# Patient Record
Sex: FEMALE | Race: WHITE | Employment: OTHER | URBAN - NONMETROPOLITAN AREA
[De-identification: names, ages, dates, MRNs, and addresses within clinical notes are randomized per-mention and may not be internally consistent; named-entity substitution may affect disease eponyms.]

---

## 2017-03-10 PROBLEM — E11.620 NLD (NECROBIOSIS LIPOIDICA DIABETICORUM) (HCC): Status: ACTIVE | Noted: 2017-03-10

## 2017-03-10 PROBLEM — M54.30 SCIATICA: Status: ACTIVE | Noted: 2017-03-10

## 2017-03-10 PROBLEM — I10 ESSENTIAL HYPERTENSION: Status: ACTIVE | Noted: 2017-03-10

## 2017-03-10 PROBLEM — M10.9 GOUT: Status: ACTIVE | Noted: 2017-03-10

## 2017-03-10 PROBLEM — E66.01 MORBID OBESITY (HCC): Status: ACTIVE | Noted: 2017-03-10

## 2017-05-08 ENCOUNTER — HOSPITAL ENCOUNTER (OUTPATIENT)
Age: 42
Discharge: HOME | End: 2017-05-08
Payer: COMMERCIAL

## 2017-05-08 LAB
ABSOLUTE EOS #: 0.2 K/UL (ref 0–0.4)
ABSOLUTE LYMPH #: 1.1 K/UL (ref 1–4.8)
ABSOLUTE MONO #: 0.4 K/UL (ref 0.2–0.8)
ALBUMIN SERPL-MCNC: 4 G/DL (ref 3.5–5.2)
ALBUMIN/GLOBULIN RATIO: 1.5 (ref 1–2.5)
ALP BLD-CCNC: 77 U/L (ref 35–104)
ALT SERPL-CCNC: 16 U/L (ref 5–33)
ANION GAP SERPL CALCULATED.3IONS-SCNC: 14 MMOL/L (ref 9–17)
AST SERPL-CCNC: 18 U/L
BASOPHILS # BLD: 0 %
BASOPHILS ABSOLUTE: 0 K/UL (ref 0–0.2)
BILIRUB SERPL-MCNC: 0.8 MG/DL (ref 0.3–1.2)
BUN BLDV-MCNC: 14 MG/DL (ref 6–20)
BUN/CREAT BLD: 19 (ref 9–20)
CALCIUM SERPL-MCNC: 8.6 MG/DL (ref 8.6–10.4)
CHLORIDE BLD-SCNC: 103 MMOL/L (ref 98–107)
CHOLESTEROL/HDL RATIO: 6.2
CHOLESTEROL: 217 MG/DL
CO2: 23 MMOL/L (ref 20–31)
CREAT SERPL-MCNC: 0.74 MG/DL (ref 0.5–0.9)
DIFFERENTIAL TYPE: ABNORMAL
EOSINOPHILS RELATIVE PERCENT: 3 %
GFR AFRICAN AMERICAN: >60 ML/MIN
GFR NON-AFRICAN AMERICAN: >60 ML/MIN
GFR SERPL CREATININE-BSD FRML MDRD: ABNORMAL ML/MIN/{1.73_M2}
GFR SERPL CREATININE-BSD FRML MDRD: ABNORMAL ML/MIN/{1.73_M2}
GLUCOSE BLD-MCNC: 113 MG/DL (ref 70–99)
HCT VFR BLD CALC: 26.5 % (ref 36–46)
HDLC SERPL-MCNC: 35 MG/DL
HEMOGLOBIN: 8.9 G/DL (ref 12–16)
LDL CHOLESTEROL: 121 MG/DL (ref 0–130)
LYMPHOCYTES # BLD: 20 %
MAGNESIUM: 1.7 MG/DL (ref 1.6–2.6)
MCH RBC QN AUTO: 28.4 PG (ref 26–34)
MCHC RBC AUTO-ENTMCNC: 33.6 G/DL (ref 31–37)
MCV RBC AUTO: 84.4 FL (ref 80–100)
MONOCYTES # BLD: 8 %
PDW BLD-RTO: 13.9 % (ref 12.1–15.2)
PLATELET # BLD: 242 K/UL (ref 140–450)
PLATELET ESTIMATE: ABNORMAL
PMV BLD AUTO: ABNORMAL FL (ref 6–12)
POTASSIUM SERPL-SCNC: 4.4 MMOL/L (ref 3.7–5.3)
RBC # BLD: 3.14 M/UL (ref 4–5.2)
RBC # BLD: ABNORMAL 10*6/UL
SEG NEUTROPHILS: 69 %
SEGMENTED NEUTROPHILS ABSOLUTE COUNT: 3.7 K/UL (ref 1.8–7.7)
SODIUM BLD-SCNC: 140 MMOL/L (ref 135–144)
TOTAL PROTEIN: 6.6 G/DL (ref 6.4–8.3)
TRIGL SERPL-MCNC: 303 MG/DL
URIC ACID: 6 MG/DL (ref 2.4–5.7)
VLDLC SERPL CALC-MCNC: ABNORMAL MG/DL (ref 1–30)
WBC # BLD: 5.4 K/UL (ref 3.5–11)
WBC # BLD: ABNORMAL 10*3/UL

## 2017-05-08 PROCEDURE — 80053 COMPREHEN METABOLIC PANEL: CPT

## 2017-05-08 PROCEDURE — 84550 ASSAY OF BLOOD/URIC ACID: CPT

## 2017-05-08 PROCEDURE — 36415 COLL VENOUS BLD VENIPUNCTURE: CPT

## 2017-05-08 PROCEDURE — 85025 COMPLETE CBC W/AUTO DIFF WBC: CPT

## 2017-05-08 PROCEDURE — 83735 ASSAY OF MAGNESIUM: CPT

## 2017-05-08 PROCEDURE — 80061 LIPID PANEL: CPT

## 2017-05-09 LAB
CULTURE: NORMAL
Lab: NORMAL
SPECIMEN DESCRIPTION: NORMAL
SPECIMEN DESCRIPTION: NORMAL
STATUS: NORMAL

## 2017-05-24 ENCOUNTER — HOSPITAL ENCOUNTER (OUTPATIENT)
Dept: ULTRASOUND IMAGING | Age: 42
Discharge: HOME | End: 2017-05-24
Payer: COMMERCIAL

## 2017-05-24 DIAGNOSIS — N93.9 ABNORMAL UTERINE BLEEDING (AUB): ICD-10-CM

## 2017-05-24 PROCEDURE — 76830 TRANSVAGINAL US NON-OB: CPT

## 2017-05-24 PROCEDURE — 76856 US EXAM PELVIC COMPLETE: CPT

## 2017-05-26 ENCOUNTER — HOSPITAL ENCOUNTER (OUTPATIENT)
Dept: WOMENS IMAGING | Age: 42
Discharge: HOME | End: 2017-05-26
Payer: COMMERCIAL

## 2017-05-26 DIAGNOSIS — Z13.9 SCREENING: ICD-10-CM

## 2017-05-26 PROCEDURE — G0202 SCR MAMMO BI INCL CAD: HCPCS

## 2017-06-02 ENCOUNTER — HOSPITAL ENCOUNTER (OUTPATIENT)
Age: 42
Discharge: HOME | End: 2017-06-02
Payer: COMMERCIAL

## 2017-06-02 PROBLEM — I83.009 VENOUS STASIS ULCER (HCC): Status: ACTIVE | Noted: 2017-06-02

## 2017-06-02 PROBLEM — J40 BRONCHITIS: Status: ACTIVE | Noted: 2017-06-02

## 2017-06-02 PROBLEM — N92.1 MENORRHAGIA WITH IRREGULAR CYCLE: Status: ACTIVE | Noted: 2017-06-02

## 2017-06-02 PROBLEM — L97.909 VENOUS STASIS ULCER (HCC): Status: ACTIVE | Noted: 2017-06-02

## 2017-06-02 LAB
ABSOLUTE EOS #: 0.2 K/UL (ref 0–0.4)
ABSOLUTE LYMPH #: 1.1 K/UL (ref 1–4.8)
ABSOLUTE MONO #: 0.4 K/UL (ref 0–1)
BASOPHILS # BLD: 1 %
BASOPHILS ABSOLUTE: 0 K/UL (ref 0–0.2)
DIFFERENTIAL TYPE: ABNORMAL
EOSINOPHILS RELATIVE PERCENT: 4 %
HCT VFR BLD CALC: 31.3 % (ref 36–46)
HEMOGLOBIN: 10.5 G/DL (ref 12–16)
LYMPHOCYTES # BLD: 21 %
MCH RBC QN AUTO: 30.9 PG (ref 26–34)
MCHC RBC AUTO-ENTMCNC: 33.6 G/DL (ref 31–37)
MCV RBC AUTO: 92 FL (ref 80–100)
MONOCYTES # BLD: 8 %
PDW BLD-RTO: 21.1 % (ref 12.1–15.2)
PLATELET # BLD: 308 K/UL (ref 140–450)
PLATELET ESTIMATE: ABNORMAL
PMV BLD AUTO: ABNORMAL FL (ref 6–12)
RBC # BLD: 3.4 M/UL (ref 4–5.2)
RBC # BLD: ABNORMAL 10*6/UL
SEG NEUTROPHILS: 66 %
SEGMENTED NEUTROPHILS ABSOLUTE COUNT: 3.6 K/UL (ref 1.8–7.7)
WBC # BLD: 5.4 K/UL (ref 3.5–11)
WBC # BLD: ABNORMAL 10*3/UL

## 2017-06-02 PROCEDURE — 86480 TB TEST CELL IMMUN MEASURE: CPT

## 2017-06-02 PROCEDURE — 85025 COMPLETE CBC W/AUTO DIFF WBC: CPT

## 2017-06-02 PROCEDURE — 36415 COLL VENOUS BLD VENIPUNCTURE: CPT

## 2017-06-05 LAB
QUANTIFERON (R) TB GOLD (INCUBATED): NEGATIVE
QUANTIFERON MITOGEN: >10 IU/ML
QUANTIFERON NIL: 0.05 IU/ML
QUANTIFERON TB AG MINUS NIL: 0.01 IU/ML (ref 0–0.34)

## 2017-07-24 ENCOUNTER — HOSPITAL ENCOUNTER (OUTPATIENT)
Age: 42
Discharge: HOME | End: 2017-07-24
Payer: COMMERCIAL

## 2017-07-24 DIAGNOSIS — D68.59 HYPERCOAGULABLE STATE (HCC): ICD-10-CM

## 2017-07-24 LAB
ANION GAP SERPL CALCULATED.3IONS-SCNC: 13 MMOL/L (ref 9–17)
BUN BLDV-MCNC: 17 MG/DL (ref 6–20)
BUN/CREAT BLD: 17 (ref 9–20)
CALCIUM SERPL-MCNC: 8.5 MG/DL (ref 8.6–10.4)
CHLORIDE BLD-SCNC: 106 MMOL/L (ref 98–107)
CO2: 23 MMOL/L (ref 20–31)
CREAT SERPL-MCNC: 0.99 MG/DL (ref 0.5–0.9)
FERRITIN: 32 UG/L (ref 13–150)
GFR AFRICAN AMERICAN: >60 ML/MIN
GFR NON-AFRICAN AMERICAN: >60 ML/MIN
GFR SERPL CREATININE-BSD FRML MDRD: ABNORMAL ML/MIN/{1.73_M2}
GFR SERPL CREATININE-BSD FRML MDRD: ABNORMAL ML/MIN/{1.73_M2}
GLUCOSE BLD-MCNC: 110 MG/DL (ref 70–99)
HCT VFR BLD CALC: 22.9 % (ref 36–46)
HEMOGLOBIN: 7.6 G/DL (ref 12–16)
IRON SATURATION: 14 % (ref 20–55)
IRON: 45 UG/DL (ref 37–145)
MCH RBC QN AUTO: 31.5 PG (ref 26–34)
MCHC RBC AUTO-ENTMCNC: 33.3 G/DL (ref 31–37)
MCV RBC AUTO: 94.5 FL (ref 80–100)
PDW BLD-RTO: 16.6 % (ref 12.1–15.2)
PLATELET # BLD: 293 K/UL (ref 140–450)
PMV BLD AUTO: 8.6 FL (ref 6–12)
POTASSIUM SERPL-SCNC: 4.4 MMOL/L (ref 3.7–5.3)
RBC # BLD: 2.42 M/UL (ref 4–5.2)
SODIUM BLD-SCNC: 142 MMOL/L (ref 135–144)
TOTAL IRON BINDING CAPACITY: 314 UG/DL (ref 250–450)
UNSATURATED IRON BINDING CAPACITY: 268.8 UG/DL (ref 112–347)
WBC # BLD: 7.3 K/UL (ref 3.5–11)

## 2017-07-24 PROCEDURE — 80048 BASIC METABOLIC PNL TOTAL CA: CPT

## 2017-07-24 PROCEDURE — 85027 COMPLETE CBC AUTOMATED: CPT

## 2017-07-24 PROCEDURE — 83550 IRON BINDING TEST: CPT

## 2017-07-24 PROCEDURE — 83540 ASSAY OF IRON: CPT

## 2017-07-24 PROCEDURE — 82728 ASSAY OF FERRITIN: CPT

## 2017-07-24 PROCEDURE — 36415 COLL VENOUS BLD VENIPUNCTURE: CPT

## 2017-08-08 ENCOUNTER — HOSPITAL ENCOUNTER (OUTPATIENT)
Age: 42
Discharge: HOME | End: 2017-08-08
Payer: COMMERCIAL

## 2017-08-08 ENCOUNTER — HOSPITAL ENCOUNTER (OUTPATIENT)
Dept: GENERAL RADIOLOGY | Age: 42
Discharge: HOME | End: 2017-08-08
Payer: COMMERCIAL

## 2017-08-08 DIAGNOSIS — Z00.00 ROUTINE GENERAL MEDICAL EXAMINATION AT A HEALTH CARE FACILITY: ICD-10-CM

## 2017-08-08 PROBLEM — I87.1: Status: ACTIVE | Noted: 2017-08-08

## 2017-08-08 PROBLEM — R05.3 CHRONIC COUGH: Status: ACTIVE | Noted: 2017-08-08

## 2017-08-08 PROBLEM — D64.9 ANEMIA: Status: ACTIVE | Noted: 2017-08-08

## 2017-08-08 LAB
ANION GAP SERPL CALCULATED.3IONS-SCNC: 15 MMOL/L (ref 9–17)
BUN BLDV-MCNC: 18 MG/DL (ref 6–20)
BUN/CREAT BLD: 23 (ref 9–20)
CALCIUM SERPL-MCNC: 9.3 MG/DL (ref 8.6–10.4)
CHLORIDE BLD-SCNC: 99 MMOL/L (ref 98–107)
CO2: 24 MMOL/L (ref 20–31)
CREAT SERPL-MCNC: 0.8 MG/DL (ref 0.5–0.9)
GFR AFRICAN AMERICAN: >60 ML/MIN
GFR NON-AFRICAN AMERICAN: >60 ML/MIN
GFR SERPL CREATININE-BSD FRML MDRD: ABNORMAL ML/MIN/{1.73_M2}
GFR SERPL CREATININE-BSD FRML MDRD: ABNORMAL ML/MIN/{1.73_M2}
GLUCOSE BLD-MCNC: 115 MG/DL (ref 70–99)
HCT VFR BLD CALC: 33.8 % (ref 36–46)
HEMOGLOBIN: 11.3 G/DL (ref 12–16)
MCH RBC QN AUTO: 31.9 PG (ref 26–34)
MCHC RBC AUTO-ENTMCNC: 33.3 G/DL (ref 31–37)
MCV RBC AUTO: 95.6 FL (ref 80–100)
PDW BLD-RTO: 15.6 % (ref 12.1–15.2)
PLATELET # BLD: 300 K/UL (ref 140–450)
PMV BLD AUTO: 8.9 FL (ref 6–12)
POTASSIUM SERPL-SCNC: 4.1 MMOL/L (ref 3.7–5.3)
RBC # BLD: 3.54 M/UL (ref 4–5.2)
SODIUM BLD-SCNC: 138 MMOL/L (ref 135–144)
WBC # BLD: 7.2 K/UL (ref 3.5–11)

## 2017-08-08 PROCEDURE — 36415 COLL VENOUS BLD VENIPUNCTURE: CPT

## 2017-08-08 PROCEDURE — 71020 XR CHEST STANDARD TWO VW: CPT

## 2017-08-08 PROCEDURE — 85027 COMPLETE CBC AUTOMATED: CPT

## 2017-08-08 PROCEDURE — 80048 BASIC METABOLIC PNL TOTAL CA: CPT

## 2017-08-09 PROBLEM — Z00.00 ROUTINE GENERAL MEDICAL EXAMINATION AT A HEALTH CARE FACILITY: Status: RESOLVED | Noted: 2017-08-08 | Resolved: 2017-08-09

## 2017-08-31 ENCOUNTER — HOSPITAL ENCOUNTER (OUTPATIENT)
Dept: WOUND CARE | Age: 42
Discharge: HOME | End: 2017-08-31
Payer: COMMERCIAL

## 2017-08-31 ENCOUNTER — HOSPITAL ENCOUNTER (OUTPATIENT)
Age: 42
Discharge: HOME | End: 2017-08-31
Payer: COMMERCIAL

## 2017-08-31 ENCOUNTER — HOSPITAL ENCOUNTER (OUTPATIENT)
Dept: LAB | Age: 42
Discharge: HOME | End: 2017-08-31
Payer: COMMERCIAL

## 2017-08-31 VITALS
RESPIRATION RATE: 20 BRPM | TEMPERATURE: 98.6 F | BODY MASS INDEX: 46.41 KG/M2 | HEART RATE: 73 BPM | DIASTOLIC BLOOD PRESSURE: 79 MMHG | WEIGHT: 261.9 LBS | SYSTOLIC BLOOD PRESSURE: 143 MMHG | HEIGHT: 63 IN

## 2017-08-31 DIAGNOSIS — I87.031: Primary | Chronic | ICD-10-CM

## 2017-08-31 LAB
-: ABNORMAL
ABSOLUTE EOS #: 0.3 K/UL (ref 0–0.4)
ABSOLUTE LYMPH #: 1.4 K/UL (ref 1–4.8)
ABSOLUTE MONO #: 0.7 K/UL (ref 0–1)
ALBUMIN SERPL-MCNC: 4.5 G/DL (ref 3.5–5.2)
ALBUMIN/GLOBULIN RATIO: 1.5 (ref 1–2.5)
ALP BLD-CCNC: 86 U/L (ref 35–104)
ALT SERPL-CCNC: 26 U/L (ref 5–33)
AMORPHOUS: ABNORMAL
ANION GAP SERPL CALCULATED.3IONS-SCNC: 15 MMOL/L (ref 9–17)
AST SERPL-CCNC: 24 U/L
BACTERIA: ABNORMAL
BASOPHILS # BLD: 2 %
BASOPHILS ABSOLUTE: 0.2 K/UL (ref 0–0.2)
BILIRUB SERPL-MCNC: 0.57 MG/DL (ref 0.3–1.2)
BILIRUBIN URINE: NEGATIVE
BUN BLDV-MCNC: 19 MG/DL (ref 6–20)
BUN/CREAT BLD: 22 (ref 9–20)
CALCIUM SERPL-MCNC: 9.3 MG/DL (ref 8.6–10.4)
CASTS UA: ABNORMAL /LPF
CHLORIDE BLD-SCNC: 98 MMOL/L (ref 98–107)
CHOLESTEROL/HDL RATIO: 6.3
CHOLESTEROL: 241 MG/DL
CO2: 24 MMOL/L (ref 20–31)
COLOR: YELLOW
COMMENT UA: ABNORMAL
CREAT SERPL-MCNC: 0.87 MG/DL (ref 0.5–0.9)
CRYSTALS, UA: ABNORMAL /HPF
DIFFERENTIAL TYPE: ABNORMAL
EOSINOPHILS RELATIVE PERCENT: 2 %
EPITHELIAL CELLS UA: ABNORMAL /HPF (ref 0–25)
GFR AFRICAN AMERICAN: >60 ML/MIN
GFR NON-AFRICAN AMERICAN: >60 ML/MIN
GFR SERPL CREATININE-BSD FRML MDRD: ABNORMAL ML/MIN/{1.73_M2}
GFR SERPL CREATININE-BSD FRML MDRD: ABNORMAL ML/MIN/{1.73_M2}
GLUCOSE BLD-MCNC: 95 MG/DL (ref 70–99)
GLUCOSE URINE: NEGATIVE
HCT VFR BLD CALC: 39.4 % (ref 36–46)
HDLC SERPL-MCNC: 38 MG/DL
HEMOGLOBIN: 13.1 G/DL (ref 12–16)
KETONES, URINE: NEGATIVE
LDL CHOLESTEROL: 129 MG/DL (ref 0–130)
LEUKOCYTE ESTERASE, URINE: NEGATIVE
LYMPHOCYTES # BLD: 12 %
MAGNESIUM: 1.7 MG/DL (ref 1.6–2.6)
MCH RBC QN AUTO: 31.3 PG (ref 26–34)
MCHC RBC AUTO-ENTMCNC: 33.4 G/DL (ref 31–37)
MCV RBC AUTO: 93.8 FL (ref 80–100)
MONOCYTES # BLD: 6 %
MUCUS: ABNORMAL
NITRITE, URINE: NEGATIVE
OTHER OBSERVATIONS UA: ABNORMAL
PDW BLD-RTO: 14.1 % (ref 12.1–15.2)
PH UA: 5.5 (ref 5–9)
PLATELET # BLD: 336 K/UL (ref 140–450)
PLATELET ESTIMATE: ABNORMAL
PMV BLD AUTO: 9.1 FL (ref 6–12)
POTASSIUM SERPL-SCNC: 4.2 MMOL/L (ref 3.7–5.3)
PROTEIN UA: NEGATIVE
RBC # BLD: 4.2 M/UL (ref 4–5.2)
RBC # BLD: ABNORMAL 10*6/UL
RBC UA: ABNORMAL /HPF (ref 0–2)
RENAL EPITHELIAL, UA: ABNORMAL /HPF
SEG NEUTROPHILS: 78 %
SEGMENTED NEUTROPHILS ABSOLUTE COUNT: 9.1 K/UL (ref 1.8–7.7)
SODIUM BLD-SCNC: 137 MMOL/L (ref 135–144)
SPECIFIC GRAVITY UA: >1.03 (ref 1.01–1.02)
TOTAL PROTEIN: 7.6 G/DL (ref 6.4–8.3)
TRICHOMONAS: ABNORMAL
TRIGL SERPL-MCNC: 370 MG/DL
TURBIDITY: CLEAR
URIC ACID: 5.3 MG/DL (ref 2.4–5.7)
URINE HGB: ABNORMAL
UROBILINOGEN, URINE: NORMAL
VLDLC SERPL CALC-MCNC: ABNORMAL MG/DL (ref 1–30)
WBC # BLD: 11.7 K/UL (ref 3.5–11)
WBC # BLD: ABNORMAL 10*3/UL
WBC UA: ABNORMAL /HPF (ref 0–5)
YEAST: ABNORMAL

## 2017-08-31 PROCEDURE — 85025 COMPLETE CBC W/AUTO DIFF WBC: CPT

## 2017-08-31 PROCEDURE — 86403 PARTICLE AGGLUT ANTBDY SCRN: CPT

## 2017-08-31 PROCEDURE — 87070 CULTURE OTHR SPECIMN AEROBIC: CPT

## 2017-08-31 PROCEDURE — 80053 COMPREHEN METABOLIC PANEL: CPT

## 2017-08-31 PROCEDURE — 36415 COLL VENOUS BLD VENIPUNCTURE: CPT

## 2017-08-31 PROCEDURE — 87077 CULTURE AEROBIC IDENTIFY: CPT

## 2017-08-31 PROCEDURE — 83735 ASSAY OF MAGNESIUM: CPT

## 2017-08-31 PROCEDURE — 87205 SMEAR GRAM STAIN: CPT

## 2017-08-31 PROCEDURE — 80061 LIPID PANEL: CPT

## 2017-08-31 PROCEDURE — 87184 SC STD DISK METHOD PER PLATE: CPT

## 2017-08-31 PROCEDURE — 84550 ASSAY OF BLOOD/URIC ACID: CPT

## 2017-08-31 PROCEDURE — 11042 DBRDMT SUBQ TIS 1ST 20SQCM/<: CPT

## 2017-08-31 PROCEDURE — 11042 DBRDMT SUBQ TIS 1ST 20SQCM/<: CPT | Performed by: PODIATRIST

## 2017-08-31 PROCEDURE — 81001 URINALYSIS AUTO W/SCOPE: CPT

## 2017-08-31 PROCEDURE — 87186 SC STD MICRODIL/AGAR DIL: CPT

## 2017-09-05 ENCOUNTER — PRE-PROCEDURE TELEPHONE (OUTPATIENT)
Dept: WOUND CARE | Age: 42
End: 2017-09-05

## 2017-09-05 LAB
CULTURE: ABNORMAL
DIRECT EXAM: ABNORMAL
DIRECT EXAM: ABNORMAL
Lab: ABNORMAL
Lab: ABNORMAL
ORGANISM: ABNORMAL
SPECIMEN DESCRIPTION: ABNORMAL
SPECIMEN DESCRIPTION: ABNORMAL
STATUS: ABNORMAL

## 2017-09-15 PROBLEM — E78.5 HYPERLIPIDEMIA: Status: ACTIVE | Noted: 2017-09-15

## 2017-09-15 PROBLEM — E66.9 OBESITY: Status: ACTIVE | Noted: 2017-09-15

## 2017-09-20 ENCOUNTER — HOSPITAL ENCOUNTER (OUTPATIENT)
Dept: LAB | Age: 42
Setting detail: SPECIMEN
Discharge: HOME | End: 2017-09-20
Payer: COMMERCIAL

## 2017-09-20 ENCOUNTER — HOSPITAL ENCOUNTER (OUTPATIENT)
Dept: WOUND CARE | Age: 42
Discharge: HOME | End: 2017-09-20
Payer: COMMERCIAL

## 2017-09-20 VITALS
SYSTOLIC BLOOD PRESSURE: 136 MMHG | DIASTOLIC BLOOD PRESSURE: 77 MMHG | TEMPERATURE: 98.1 F | RESPIRATION RATE: 20 BRPM | HEIGHT: 63 IN | HEART RATE: 77 BPM

## 2017-09-20 DIAGNOSIS — A49.8 BACTERIAL INFECTION DUE TO PSEUDOMONAS: ICD-10-CM

## 2017-09-20 DIAGNOSIS — A49.01 BACTERIAL INFECTION DUE TO STAPHYLOCOCCUS AUREUS: ICD-10-CM

## 2017-09-20 DIAGNOSIS — E11.620 NLD (NECROBIOSIS LIPOIDICA DIABETICORUM) (HCC): Primary | ICD-10-CM

## 2017-09-20 PROCEDURE — 87077 CULTURE AEROBIC IDENTIFY: CPT

## 2017-09-20 PROCEDURE — 87070 CULTURE OTHR SPECIMN AEROBIC: CPT

## 2017-09-20 PROCEDURE — 87186 SC STD MICRODIL/AGAR DIL: CPT

## 2017-09-20 PROCEDURE — 86403 PARTICLE AGGLUT ANTBDY SCRN: CPT

## 2017-09-20 PROCEDURE — 99212 OFFICE O/P EST SF 10 MIN: CPT | Performed by: PODIATRIST

## 2017-09-20 PROCEDURE — 99212 OFFICE O/P EST SF 10 MIN: CPT

## 2017-09-20 PROCEDURE — 87205 SMEAR GRAM STAIN: CPT

## 2017-09-23 LAB
CULTURE: ABNORMAL
DIRECT EXAM: ABNORMAL
Lab: ABNORMAL
Lab: ABNORMAL
ORGANISM: ABNORMAL
SPECIMEN DESCRIPTION: ABNORMAL
SPECIMEN DESCRIPTION: ABNORMAL
STATUS: ABNORMAL

## 2017-09-25 PROBLEM — A49.8 BACTERIAL INFECTION DUE TO PSEUDOMONAS: Status: ACTIVE | Noted: 2017-09-25

## 2017-09-25 PROBLEM — A49.01 BACTERIAL INFECTION DUE TO STAPHYLOCOCCUS AUREUS: Status: ACTIVE | Noted: 2017-09-25

## 2017-10-23 ENCOUNTER — HOSPITAL ENCOUNTER (OUTPATIENT)
Age: 42
Setting detail: SPECIMEN
Discharge: HOME | End: 2017-10-23
Payer: COMMERCIAL

## 2017-10-23 ENCOUNTER — HOSPITAL ENCOUNTER (OUTPATIENT)
Dept: INTERVENTIONAL RADIOLOGY/VASCULAR | Age: 42
Discharge: HOME | End: 2017-10-23
Payer: COMMERCIAL

## 2017-10-23 ENCOUNTER — HOSPITAL ENCOUNTER (OUTPATIENT)
Dept: GENERAL RADIOLOGY | Age: 42
Discharge: HOME | End: 2017-10-23
Payer: COMMERCIAL

## 2017-10-23 VITALS — RESPIRATION RATE: 16 BRPM | OXYGEN SATURATION: 100 % | HEART RATE: 79 BPM

## 2017-10-23 DIAGNOSIS — S81.809A OPEN WOUND OF LOWER EXTREMITY, UNSPECIFIED LATERALITY, INITIAL ENCOUNTER: ICD-10-CM

## 2017-10-23 DIAGNOSIS — Z95.828 S/P PICC CENTRAL LINE PLACEMENT: ICD-10-CM

## 2017-10-23 LAB
ABSOLUTE EOS #: 0.2 K/UL (ref 0–0.4)
ABSOLUTE IMMATURE GRANULOCYTE: NORMAL K/UL (ref 0–0.3)
ABSOLUTE LYMPH #: 1.5 K/UL (ref 1–4.8)
ABSOLUTE MONO #: 0.7 K/UL (ref 0.2–0.8)
ANION GAP SERPL CALCULATED.3IONS-SCNC: 16 MMOL/L (ref 9–17)
BASOPHILS # BLD: 0 %
BASOPHILS ABSOLUTE: 0 K/UL (ref 0–0.2)
BUN BLDV-MCNC: 16 MG/DL (ref 6–20)
BUN/CREAT BLD: 23 (ref 9–20)
CALCIUM SERPL-MCNC: 9.4 MG/DL (ref 8.6–10.4)
CHLORIDE BLD-SCNC: 102 MMOL/L (ref 98–107)
CO2: 23 MMOL/L (ref 20–31)
CREAT SERPL-MCNC: 0.69 MG/DL (ref 0.5–0.9)
DIFFERENTIAL TYPE: NORMAL
EOSINOPHILS RELATIVE PERCENT: 3 %
GFR AFRICAN AMERICAN: >60 ML/MIN
GFR NON-AFRICAN AMERICAN: >60 ML/MIN
GFR SERPL CREATININE-BSD FRML MDRD: ABNORMAL ML/MIN/{1.73_M2}
GFR SERPL CREATININE-BSD FRML MDRD: ABNORMAL ML/MIN/{1.73_M2}
GLUCOSE BLD-MCNC: 78 MG/DL (ref 70–99)
HCT VFR BLD CALC: 39.2 % (ref 36–46)
HEMOGLOBIN: 13 G/DL (ref 12–16)
IMMATURE GRANULOCYTES: NORMAL %
LYMPHOCYTES # BLD: 18 %
MCH RBC QN AUTO: 31.1 PG (ref 26–34)
MCHC RBC AUTO-ENTMCNC: 33.2 G/DL (ref 31–37)
MCV RBC AUTO: 93.5 FL (ref 80–100)
MONOCYTES # BLD: 8 %
PDW BLD-RTO: 13.2 % (ref 12.1–15.2)
PLATELET # BLD: 307 K/UL (ref 140–450)
PLATELET ESTIMATE: NORMAL
PMV BLD AUTO: 9.4 FL (ref 6–12)
POTASSIUM SERPL-SCNC: 4.4 MMOL/L (ref 3.7–5.3)
RBC # BLD: 4.19 M/UL (ref 4–5.2)
RBC # BLD: NORMAL 10*6/UL
SEG NEUTROPHILS: 71 %
SEGMENTED NEUTROPHILS ABSOLUTE COUNT: 5.9 K/UL (ref 1.8–7.7)
SODIUM BLD-SCNC: 141 MMOL/L (ref 135–144)
WBC # BLD: 8.3 K/UL (ref 3.5–11)
WBC # BLD: NORMAL 10*3/UL

## 2017-10-23 PROCEDURE — 77002 NEEDLE LOCALIZATION BY XRAY: CPT

## 2017-10-23 PROCEDURE — 36569 INSJ PICC 5 YR+ W/O IMAGING: CPT | Performed by: RADIOLOGY

## 2017-10-23 PROCEDURE — 85025 COMPLETE CBC W/AUTO DIFF WBC: CPT

## 2017-10-23 PROCEDURE — 80048 BASIC METABOLIC PNL TOTAL CA: CPT

## 2017-10-23 PROCEDURE — C1751 CATH, INF, PER/CENT/MIDLINE: HCPCS

## 2017-10-23 PROCEDURE — 77001 FLUOROGUIDE FOR VEIN DEVICE: CPT | Performed by: RADIOLOGY

## 2017-10-23 PROCEDURE — 2500000003 HC RX 250 WO HCPCS: Performed by: RADIOLOGY

## 2017-10-23 RX ORDER — LIDOCAINE HYDROCHLORIDE 20 MG/ML
INJECTION, SOLUTION INFILTRATION; PERINEURAL
Status: COMPLETED | OUTPATIENT
Start: 2017-10-23 | End: 2017-10-23

## 2017-10-23 RX ADMIN — LIDOCAINE HYDROCHLORIDE 2 ML: 20 INJECTION, SOLUTION INFILTRATION; PERINEURAL at 15:40

## 2017-10-23 NOTE — SEDATION DOCUMENTATION
Pt tolerated procedure well. Drsg applied. Pt cleaned . Instructions are give. Pt left ambulatory without any difficulites.

## 2017-11-29 ENCOUNTER — HOSPITAL ENCOUNTER (OUTPATIENT)
Age: 42
Discharge: HOME | End: 2017-11-29
Payer: COMMERCIAL

## 2017-11-29 LAB
-: ABNORMAL
ABSOLUTE EOS #: 0.3 K/UL (ref 0–0.4)
ABSOLUTE IMMATURE GRANULOCYTE: ABNORMAL K/UL (ref 0–0.3)
ABSOLUTE LYMPH #: 2.6 K/UL (ref 1–4.8)
ABSOLUTE MONO #: 0.9 K/UL (ref 0–1)
ALBUMIN SERPL-MCNC: 3.8 G/DL (ref 3.5–5.2)
ALBUMIN/GLOBULIN RATIO: 1.4 (ref 1–2.5)
ALP BLD-CCNC: 58 U/L (ref 35–104)
ALT SERPL-CCNC: 38 U/L (ref 5–33)
AMORPHOUS: ABNORMAL
ANION GAP SERPL CALCULATED.3IONS-SCNC: 15 MMOL/L (ref 9–17)
AST SERPL-CCNC: 14 U/L
BACTERIA: ABNORMAL
BASOPHILS # BLD: 1 % (ref 0–2)
BASOPHILS ABSOLUTE: 0.2 K/UL (ref 0–0.2)
BILIRUB SERPL-MCNC: 0.5 MG/DL (ref 0.3–1.2)
BILIRUBIN URINE: NEGATIVE
BUN BLDV-MCNC: 26 MG/DL (ref 6–20)
BUN/CREAT BLD: 33 (ref 9–20)
CALCIUM SERPL-MCNC: 8.8 MG/DL (ref 8.6–10.4)
CASTS UA: ABNORMAL /LPF
CHLORIDE BLD-SCNC: 101 MMOL/L (ref 98–107)
CHOLESTEROL/HDL RATIO: 2.7
CHOLESTEROL: 203 MG/DL
CO2: 24 MMOL/L (ref 20–31)
COLOR: YELLOW
COMMENT UA: ABNORMAL
CREAT SERPL-MCNC: 0.78 MG/DL (ref 0.5–0.9)
CRYSTALS, UA: ABNORMAL /HPF
DIFFERENTIAL TYPE: ABNORMAL
EOSINOPHILS RELATIVE PERCENT: 2 % (ref 0–8)
EPITHELIAL CELLS UA: ABNORMAL /HPF (ref 0–25)
GFR AFRICAN AMERICAN: >60 ML/MIN
GFR NON-AFRICAN AMERICAN: >60 ML/MIN
GFR SERPL CREATININE-BSD FRML MDRD: ABNORMAL ML/MIN/{1.73_M2}
GFR SERPL CREATININE-BSD FRML MDRD: ABNORMAL ML/MIN/{1.73_M2}
GLUCOSE BLD-MCNC: 86 MG/DL (ref 70–99)
GLUCOSE URINE: NEGATIVE
HCT VFR BLD CALC: 42.4 % (ref 36–46)
HDLC SERPL-MCNC: 75 MG/DL
HEMOGLOBIN: 13.9 G/DL (ref 12–16)
IMMATURE GRANULOCYTES: ABNORMAL %
KETONES, URINE: NEGATIVE
LDL CHOLESTEROL: 100 MG/DL (ref 0–130)
LEUKOCYTE ESTERASE, URINE: NEGATIVE
LYMPHOCYTES # BLD: 20 % (ref 24–44)
MAGNESIUM: 1.9 MG/DL (ref 1.6–2.6)
MCH RBC QN AUTO: 31.5 PG (ref 26–34)
MCHC RBC AUTO-ENTMCNC: 32.9 G/DL (ref 31–37)
MCV RBC AUTO: 95.7 FL (ref 80–100)
MONOCYTES # BLD: 7 % (ref 0–12)
MUCUS: ABNORMAL
NITRITE, URINE: NEGATIVE
OTHER OBSERVATIONS UA: ABNORMAL
PDW BLD-RTO: 14.4 % (ref 12.1–15.2)
PH UA: 5.5 (ref 5–9)
PLATELET # BLD: 255 K/UL (ref 140–450)
PLATELET ESTIMATE: ABNORMAL
PMV BLD AUTO: 8.6 FL (ref 6–12)
POTASSIUM SERPL-SCNC: 4.1 MMOL/L (ref 3.7–5.3)
PROTEIN UA: NEGATIVE
RBC # BLD: 4.43 M/UL (ref 4–5.2)
RBC # BLD: ABNORMAL 10*6/UL
RBC UA: ABNORMAL /HPF (ref 0–2)
RENAL EPITHELIAL, UA: ABNORMAL /HPF
SEG NEUTROPHILS: 70 % (ref 36–66)
SEGMENTED NEUTROPHILS ABSOLUTE COUNT: 9.3 K/UL (ref 1.8–7.7)
SODIUM BLD-SCNC: 140 MMOL/L (ref 135–144)
SPECIFIC GRAVITY UA: >1.03 (ref 1.01–1.02)
TOTAL PROTEIN: 6.6 G/DL (ref 6.4–8.3)
TRICHOMONAS: ABNORMAL
TRIGL SERPL-MCNC: 141 MG/DL
TURBIDITY: CLEAR
URIC ACID: 3.7 MG/DL (ref 2.4–5.7)
URINE HGB: ABNORMAL
UROBILINOGEN, URINE: NORMAL
VLDLC SERPL CALC-MCNC: ABNORMAL MG/DL (ref 1–30)
WBC # BLD: 13.3 K/UL (ref 3.5–11)
WBC # BLD: ABNORMAL 10*3/UL
WBC UA: ABNORMAL /HPF (ref 0–5)
YEAST: ABNORMAL

## 2017-11-29 PROCEDURE — 83735 ASSAY OF MAGNESIUM: CPT

## 2017-11-29 PROCEDURE — 80053 COMPREHEN METABOLIC PANEL: CPT

## 2017-11-29 PROCEDURE — 81001 URINALYSIS AUTO W/SCOPE: CPT

## 2017-11-29 PROCEDURE — 80061 LIPID PANEL: CPT

## 2017-11-29 PROCEDURE — 85025 COMPLETE CBC W/AUTO DIFF WBC: CPT

## 2017-11-29 PROCEDURE — 84550 ASSAY OF BLOOD/URIC ACID: CPT

## 2017-11-29 PROCEDURE — 36415 COLL VENOUS BLD VENIPUNCTURE: CPT

## 2017-12-21 ENCOUNTER — HOSPITAL ENCOUNTER (OUTPATIENT)
Dept: WOUND CARE | Age: 42
Discharge: HOME | End: 2017-12-21
Payer: COMMERCIAL

## 2017-12-21 ENCOUNTER — HOSPITAL ENCOUNTER (OUTPATIENT)
Dept: LAB | Age: 42
Discharge: HOME | End: 2017-12-21
Payer: COMMERCIAL

## 2017-12-21 VITALS
TEMPERATURE: 98.5 F | RESPIRATION RATE: 20 BRPM | BODY MASS INDEX: 50.36 KG/M2 | DIASTOLIC BLOOD PRESSURE: 72 MMHG | WEIGHT: 284.2 LBS | HEIGHT: 63 IN | HEART RATE: 88 BPM | SYSTOLIC BLOOD PRESSURE: 138 MMHG

## 2017-12-21 DIAGNOSIS — I83.022 VENOUS STASIS ULCER OF LEFT CALF WITH FAT LAYER EXPOSED WITH VARICOSE VEINS (HCC): Primary | ICD-10-CM

## 2017-12-21 DIAGNOSIS — L97.222 VENOUS STASIS ULCER OF LEFT CALF WITH FAT LAYER EXPOSED WITH VARICOSE VEINS (HCC): Primary | ICD-10-CM

## 2017-12-21 PROCEDURE — 87077 CULTURE AEROBIC IDENTIFY: CPT

## 2017-12-21 PROCEDURE — 86403 PARTICLE AGGLUT ANTBDY SCRN: CPT

## 2017-12-21 PROCEDURE — 87205 SMEAR GRAM STAIN: CPT

## 2017-12-21 PROCEDURE — 87186 SC STD MICRODIL/AGAR DIL: CPT

## 2017-12-21 PROCEDURE — 99213 OFFICE O/P EST LOW 20 MIN: CPT | Performed by: PODIATRIST

## 2017-12-21 PROCEDURE — 87070 CULTURE OTHR SPECIMN AEROBIC: CPT

## 2017-12-21 PROCEDURE — 99213 OFFICE O/P EST LOW 20 MIN: CPT

## 2017-12-21 NOTE — PROGRESS NOTES
Wound Management Medical Nutrition Therapy Follow-Up    Wt Readings from Last 3 Encounters:   12/21/17 284 lb 3.2 oz (128.9 kg)   09/15/17 266 lb 12.8 oz (121 kg)   08/31/17 261 lb 14.4 oz (118.8 kg)     Significant Weight Change- No   Unintentional- Yes     Patient Adalimumab, Iron, albuterol sulfate HFA, allopurinol, apixaban, aspirin, cycloSPORINE modified, ketorolac, metoprolol tartrate, oxyCODONE-acetaminophen, pentoxifylline, ranitidine, and silver sulfADIAZINE    Labs -   Lab Results   Component Value Date    LABALBU 3.8 11/29/2017    GLUCOSE 86 11/29/2017    LABA1C 5.3 08/12/2015    PREALBUMIN 18.0 03/26/2015     Other Labs - none noted    Glucometer readings at home are running n/a. Diabetes Control- not applicable    Dietary Recall Reveals- Pt tries to eat foods that will help with healing. She reports being resistant to adding in a daily MVI with minerals due to immunosuppressant medications. I recommended that she discuss with her doctor about allowing MVI with minerals, vitamin C, zinc, or vitamin A as possible aids to healing. States healing is not going to well, looking at skin grafting. She is eating dried beans and lentils. Medical Nutrition Therapy Discussed- Pt eating protein foods. Discussed use of vitamins for healing needs and Ricardo BID. She took Audrene Hora in the past. I encouraged her to discuss vitamin intakes with her physician. I provided Nutrition management of pressure ulcers and Helping Wounds Heal handouts to Pt. Nutrition Therapy Recommendations-   Continue to have 3 well balanced meals with a variety of lean meats, fresh fruits, vegetables, whole grains and low fat dairy. Include lean sources of protein daily to aid healing needs. Consume food sources of zinc, vitamin C, and protein to aid healing.    Discuss with physician inclusion of vitamins with daily medication regimen         Follow-up- quarterly    Thor Myers RDN, [facility] 12/21/2017 11:40 AM        PQRS Measure: #1(Diabetes: Hemoglobin A1C Poor Control) - no   PQRS Measure #130 (Documentation of Current Medications in Medical Record) - yes

## 2017-12-21 NOTE — PROGRESS NOTES
Subjective      Bobo Fallon is a 40 y.o. female who presents for evaluation. She  has  Wound(s) which are/is located on the  R lower leg lateral, R lower leg posterior. Cc: right LE         Ulcer ; open with mild drainage                     Pain with dressing care   HPI: [facility] dermatology           immunosuppressive therapy ; DDx: Luz Feat / Morphea           Correspondence with Dr. Vanessa Lazar @ [facility] ; amenable to skin substitute        PAST MEDICAL HISTORY     has a past medical history of Asthma; Chronic pain syndrome; DVT (deep venous thrombosis) ([facility] [address] GERD (gastroesophageal reflux disease); Hyperlipidemia; Hypertension; Inferior vena cava obstruction; Morbid obesity ([facility] [address] Necrobiosis lipoidica; Peripheral vascular disease ([facility] [address] Sciatica; and Venous insufficiency.     MEDICATIONS    Current Outpatient Prescriptions   Medication Sig Dispense Refill    aspirin 81 MG tablet Take 1 tablet by mouth daily 90 tablet 3    apixaban (ELIQUIS) 5 MG TABS tablet Take 1 tablet by mouth 2 times daily 180 tablet 3    metoprolol tartrate (LOPRESSOR) 25 MG tablet TAKE 1 TABLET BY MOUTH TWICE A  tablet 3    pentoxifylline (TRENTAL) 400 MG extended release tablet TAKE 1 TABLET BY MOUTH 2 TIMES DAILY 180 tablet 3    cycloSPORINE modified (NEORAL) 25 MG capsule Take 75 mg by mouth 2 times daily      albuterol sulfate HFA (PROAIR HFA) 108 (90 BASE) MCG/ACT inhaler Inhale 2 puffs into the lungs every 6 hours as needed for Wheezing 1 Inhaler 2    ranitidine (ZANTAC) 300 MG tablet TAKE 1 TABLET BY MOUTH 2 TIMES DAILY 180 tablet 3    Adalimumab (HUMIRA PEN SC) Inject 40 mg into the skin every 14 days      allopurinol (ZYLOPRIM) 300 MG tablet Take 1 tablet by mouth daily 90 tablet 3    silver sulfADIAZINE (SILVADENE) 1 % cream Apply topically daily as needed (if needed) 1000 g 2    oxyCODONE-acetaminophen (PERCOCET)  MG per tablet 2 tabs every 6 hours as needed for pain (Patient taking differently: 1-1.5 tabs every 6 hours as needed for pain) 180 tablet 0    Ferrous Sulfate (IRON) 325 (65 FE) MG TABS Take by mouth 2 times daily  30 tablet 0     No current facility-administered medications for this encounter.       Facility-Administered Medications Ordered in Other Encounters   Medication Dose Route Frequency Provider Last Rate Last Dose    sodium chloride flush 0.9 % injection 10 mL  10 mL Intravenous PRN Elizabeth Heir, DPM   10 mL at 03/12/15 1326    sodium chloride flush 0.9 % injection 10 mL  10 mL Intravenous PRN Elziabeth Heir, DPM   10 mL at 03/01/15 0919    sodium chloride flush 0.9 % injection 10 mL  10 mL Intravenous PRN Elizabeth Heir, DPM        sodium chloride flush 0.9 % injection 10 mL  10 mL Intravenous PRN Elizabeth Heir, DPM        sodium chloride (PF) 0.9 % injection 20 mL  20 mL Intravenous BID Elizabeth Heir, DPM   20 mL at 02/08/15 2122       ALLERGIES    Allergies   Allergen Reactions    Avelox [Moxifloxacin] Nausea And Vomiting    Ciprofloxacin Nausea And Vomiting    Zithromax [Azithromycin] Nausea And Vomiting       PAST SURGICAL HISTORY    Past Surgical History:   Procedure Laterality Date    CARPAL TUNNEL RELEASE  2006     SECTION  1998    CYSTOSCOPY N/A 08/10/2016    with bilateral stent placement    CYSTOSCOPY  2016    left stent removal, right stent exchange, right HLL    OTHER SURGICAL HISTORY  10/03/2016    DR BELL, ELECTIVE ILEOCAVAL RECONSTRUCTION    UPPER GASTROINTESTINAL ENDOSCOPY  2010    with Bx Dr Maricruz Edwards (Antral Erythema)       FAMILY HISTORY    Family History   Problem Relation Age of Onset    Seizures Mother     High Cholesterol Father     High Blood Pressure Father     Other Maternal Grandmother      CRF with kidney transplant    Colon Cancer Maternal Grandfather     Heart Attack Maternal Grandfather     Other Paternal Grandmother      THERESA, ASHD    Stroke Paternal Grandfather      smoker    Lung Cancer Paternal Grandfather

## 2017-12-24 LAB
CULTURE: ABNORMAL
DIRECT EXAM: ABNORMAL
DIRECT EXAM: ABNORMAL
Lab: ABNORMAL
Lab: ABNORMAL
ORGANISM: ABNORMAL
ORGANISM: ABNORMAL
SPECIMEN DESCRIPTION: ABNORMAL
SPECIMEN DESCRIPTION: ABNORMAL
STATUS: ABNORMAL

## 2018-01-12 RX ORDER — PREDNISONE 20 MG/1
20 TABLET ORAL DAILY
COMMUNITY
End: 2018-04-27 | Stop reason: ALTCHOICE

## 2018-01-19 ENCOUNTER — HOSPITAL ENCOUNTER (OUTPATIENT)
Age: 43
Setting detail: OUTPATIENT SURGERY
Discharge: HOME | End: 2018-01-19
Attending: PODIATRIST | Admitting: PODIATRIST
Payer: COMMERCIAL

## 2018-01-19 VITALS
RESPIRATION RATE: 18 BRPM | DIASTOLIC BLOOD PRESSURE: 110 MMHG | SYSTOLIC BLOOD PRESSURE: 171 MMHG | WEIGHT: 289 LBS | BODY MASS INDEX: 51.21 KG/M2 | OXYGEN SATURATION: 99 % | TEMPERATURE: 97 F | HEART RATE: 94 BPM | HEIGHT: 63 IN

## 2018-01-19 PROBLEM — A49.01 BACTERIAL INFECTION DUE TO STAPHYLOCOCCUS AUREUS: Status: RESOLVED | Noted: 2017-09-25 | Resolved: 2018-01-19

## 2018-01-19 PROBLEM — L97.919 IDIOPATHIC CHRONIC VENOUS HYPERTENSION OF RIGHT LOWER EXTREMITY WITH ULCER (HCC): Status: ACTIVE | Noted: 2018-01-19

## 2018-01-19 PROBLEM — I87.311 IDIOPATHIC CHRONIC VENOUS HYPERTENSION OF RIGHT LOWER EXTREMITY WITH ULCER (HCC): Status: ACTIVE | Noted: 2018-01-19

## 2018-01-19 PROBLEM — A49.8 BACTERIAL INFECTION DUE TO PSEUDOMONAS: Status: RESOLVED | Noted: 2017-09-25 | Resolved: 2018-01-19

## 2018-01-19 PROCEDURE — 15274 SKN SUB GRFT T/A/L CHILD ADD: CPT | Performed by: PODIATRIST

## 2018-01-19 PROCEDURE — 15273 SKIN SUB GRFT T/ARM/LG CHILD: CPT | Performed by: PODIATRIST

## 2018-01-19 PROCEDURE — A6402 STERILE GAUZE <= 16 SQ IN: HCPCS | Performed by: PODIATRIST

## 2018-01-19 PROCEDURE — A6222 GAUZE <=16 IN NO W/SAL W/O B: HCPCS | Performed by: PODIATRIST

## 2018-01-19 PROCEDURE — 3600000013 HC SURGERY LEVEL 3 ADDTL 15MIN: Performed by: PODIATRIST

## 2018-01-19 PROCEDURE — A6223 GAUZE >16<=48 NO W/SAL W/O B: HCPCS | Performed by: PODIATRIST

## 2018-01-19 PROCEDURE — 6360000002 HC RX W HCPCS: Performed by: PODIATRIST

## 2018-01-19 PROCEDURE — 2580000003 HC RX 258: Performed by: PODIATRIST

## 2018-01-19 PROCEDURE — 3600000003 HC SURGERY LEVEL 3 BASE: Performed by: PODIATRIST

## 2018-01-19 DEVICE — GRAFT HUM TISS NEO L7.5IN FORESKIN PROC APLIGRAF: Type: IMPLANTABLE DEVICE | Site: LEG | Status: FUNCTIONAL

## 2018-01-19 RX ORDER — SODIUM CHLORIDE 0.9 % (FLUSH) 0.9 %
10 SYRINGE (ML) INJECTION EVERY 12 HOURS SCHEDULED
Status: CANCELLED | OUTPATIENT
Start: 2018-01-19

## 2018-01-19 RX ORDER — SODIUM CHLORIDE 0.9 % (FLUSH) 0.9 %
10 SYRINGE (ML) INJECTION PRN
Status: CANCELLED | OUTPATIENT
Start: 2018-01-19

## 2018-01-19 ASSESSMENT — PAIN - FUNCTIONAL ASSESSMENT: PAIN_FUNCTIONAL_ASSESSMENT: 0-10

## 2018-01-19 NOTE — PROGRESS NOTES
Dr. Jessy Velasco brings his Ultrasonic debrider for the procedure. He also brings [employer] HA spray that he uses to localize the area.

## 2018-01-24 ENCOUNTER — HOSPITAL ENCOUNTER (OUTPATIENT)
Dept: VASCULAR LAB | Age: 43
Discharge: HOME | End: 2018-01-24
Payer: COMMERCIAL

## 2018-01-24 DIAGNOSIS — R10.32 LEFT INGUINAL PAIN: ICD-10-CM

## 2018-01-24 PROCEDURE — 93926 LOWER EXTREMITY STUDY: CPT

## 2018-01-25 ENCOUNTER — HOSPITAL ENCOUNTER (OUTPATIENT)
Dept: WOUND CARE | Age: 43
Discharge: HOME | End: 2018-01-25
Payer: COMMERCIAL

## 2018-01-25 VITALS
TEMPERATURE: 98.6 F | HEIGHT: 63 IN | DIASTOLIC BLOOD PRESSURE: 74 MMHG | RESPIRATION RATE: 18 BRPM | HEART RATE: 96 BPM | SYSTOLIC BLOOD PRESSURE: 127 MMHG

## 2018-01-25 PROCEDURE — 99212 OFFICE O/P EST SF 10 MIN: CPT | Performed by: PODIATRIST

## 2018-01-25 PROCEDURE — 99212 OFFICE O/P EST SF 10 MIN: CPT

## 2018-01-25 ASSESSMENT — PAIN SCALES - GENERAL: PAINLEVEL_OUTOF10: 0

## 2018-01-25 NOTE — PROGRESS NOTES
Kellen Saunders is a 40 y.o. female who presents for evaluation. She  has  Wound(s) which are/is located on the  R lower leg lateral, R lower leg posterior. Cc: RLE - ulcerative wound  HPI: 6 days s/p Apligraf      PAST MEDICAL HISTORY     has a past medical history of Asthma; Chronic pain syndrome; DVT (deep venous thrombosis) ([facility] [address] GERD (gastroesophageal reflux disease); Hyperlipidemia; Hypertension; Inferior vena cava obstruction; Morbid obesity ([facility] [address] Necrobiosis lipoidica; Peripheral vascular disease ([facility] [address] Sciatica; and Venous insufficiency.     MEDICATIONS    Current Outpatient Prescriptions   Medication Sig Dispense Refill    vitamin D (CHOLECALCIFEROL) 1000 UNIT TABS tablet Take 1,000 Units by mouth daily      predniSONE (DELTASONE) 20 MG tablet Take 35 mg by mouth daily      aspirin 81 MG tablet Take 1 tablet by mouth daily 90 tablet 3    apixaban (ELIQUIS) 5 MG TABS tablet Take 1 tablet by mouth 2 times daily 180 tablet 3    metoprolol tartrate (LOPRESSOR) 25 MG tablet TAKE 1 TABLET BY MOUTH TWICE A  tablet 3    pentoxifylline (TRENTAL) 400 MG extended release tablet TAKE 1 TABLET BY MOUTH 2 TIMES DAILY 180 tablet 3    cycloSPORINE modified (NEORAL) 25 MG capsule Take 75 mg by mouth 2 times daily      albuterol sulfate HFA (PROAIR HFA) 108 (90 BASE) MCG/ACT inhaler Inhale 2 puffs into the lungs every 6 hours as needed for Wheezing 1 Inhaler 2    ranitidine (ZANTAC) 300 MG tablet TAKE 1 TABLET BY MOUTH 2 TIMES DAILY 180 tablet 3    Adalimumab (HUMIRA PEN SC) Inject 40 mg into the skin every 14 days      allopurinol (ZYLOPRIM) 300 MG tablet Take 1 tablet by mouth daily 90 tablet 3    silver sulfADIAZINE (SILVADENE) 1 % cream Apply topically daily as needed (if needed) 1000 g 2    oxyCODONE-acetaminophen (PERCOCET)  MG per tablet 2 tabs every 6 hours as needed for pain (Patient taking differently: 1-1.5 tabs every 6 hours as needed for pain) 180 tablet 0    Ferrous Sulfate (IRON) 325 (65 FE) MG TABS Take by mouth daily  30 tablet 0     No current facility-administered medications for this encounter.       Facility-Administered Medications Ordered in Other Encounters   Medication Dose Route Frequency Provider Last Rate Last Dose    sodium chloride flush 0.9 % injection 10 mL  10 mL Intravenous PRN Hugh Patient, DPM   10 mL at 03/12/15 1326    sodium chloride flush 0.9 % injection 10 mL  10 mL Intravenous PRN Hugh Patient, DPM   10 mL at 03/01/15 0919    sodium chloride flush 0.9 % injection 10 mL  10 mL Intravenous PRN Margaretlyn Patient, DPM        sodium chloride flush 0.9 % injection 10 mL  10 mL Intravenous PRN Margaretlyn Patient, DPM        sodium chloride (PF) 0.9 % injection 20 mL  20 mL Intravenous BID Hugh Patient, DPM   20 mL at 02/08/15 2122       ALLERGIES    Allergies   Allergen Reactions    Avelox [Moxifloxacin] Nausea And Vomiting    Ciprofloxacin Nausea And Vomiting    Zithromax [Azithromycin] Nausea And Vomiting       PAST SURGICAL HISTORY    Past Surgical History:   Procedure Laterality Date    CARPAL TUNNEL RELEASE  2006     SECTION  1998    CYSTOSCOPY N/A 08/10/2016    with bilateral stent placement    CYSTOSCOPY  2016    left stent removal, right stent exchange, right HLL    DILATION AND CURETTAGE OF UTERUS  2017    w/ IUD placement    OTHER SURGICAL HISTORY  10/03/2016    DR Hipolito Perry, ELECTIVE ILEOCAVAL RECONSTRUCTION    [facility] FULL THICK  [facility] SCALP,ARM,LEG <20SQC Right 2018    WOUND CARE GRAFT PROCEDURE, APPLICATION OF APLIGRAF performed by Hugh Michel, DPM at [address]  2010    with Bx Dr Kannan Yañez (Antral Erythema)       FAMILY HISTORY    Family History   Problem Relation Age of Onset    Seizures Mother     High Cholesterol Father     High Blood Pressure Father     Other Maternal Grandmother      CRF with kidney transplant    Colon Cancer Maternal Grandfather     Heart (Comment) Leg Lower; Anterior;Posterior;Right #1 right lower let (Active)   Wound Image   1/25/2018  1:10 PM   Wound Type Wound 1/25/2018  1:10 PM   Wound Other 1/25/2018  1:10 PM   Dressing Status Changed 1/25/2018  1:56 PM   Dressing Changed Changed/New 1/25/2018  1:56 PM   Dressing/Treatment Ace Wrap;Vaseline gauze 1/25/2018  1:56 PM   Wound Cleansed Wound cleanser 12/21/2017 11:16 AM   Dressing Change Due 02/01/18 1/25/2018  1:56 PM   Wound Length (cm) 11 cm 1/25/2018  1:10 PM   Wound Width (cm) 20 cm 1/25/2018  1:10 PM   Wound Depth (cm)  0.2 1/25/2018  1:10 PM   Calculated Wound Size (cm^2) (l*w) 220 cm^2 1/25/2018  1:10 PM   Change in Wound Size % (l*w) -57.14 1/25/2018  1:10 PM   Wound Assessment Edges well approximated;Pink;Yellow 1/25/2018  1:10 PM   Drainage Amount Large 1/25/2018  1:10 PM   Drainage Description Serosanguinous 1/25/2018  1:10 PM   Odor Mild 1/25/2018  1:10 PM   Margins Attached edges 1/25/2018  1:10 PM   Saumya-wound Assessment Brown 1/25/2018  1:10 PM   Cameron%Wound Bed 25 1/25/2018  1:10 PM   Red%Wound Bed 90 12/21/2017 11:00 AM   Yellow%Wound Bed 75 1/25/2018  1:10 PM   Culture Taken No 1/25/2018  1:56 PM   Debridement per physician None 1/25/2018  1:56 PM   Time out Yes 8/31/2017  2:33 PM   Procedural Pain 0 12/21/2017 11:16 AM   Post procedural Pain 0 12/21/2017 11:16 AM   Number of days: 945         Debridement no  IMP: FT ulceration RLE ; chronic            S/p Apligraf ; stable   REC: dressing care consistent with  recommendations @ 1 week   P: RTC 1 week        See orders / AVS     Today's dressing: Other: Adaptic + \"ExuDry\" & ACE   Today's orders: see AVS      Cottie Lundborg  1/25/2018  2:22 PM

## 2018-02-01 ENCOUNTER — HOSPITAL ENCOUNTER (OUTPATIENT)
Dept: WOUND CARE | Age: 43
Discharge: HOME | End: 2018-02-01
Payer: COMMERCIAL

## 2018-02-01 VITALS
HEART RATE: 84 BPM | RESPIRATION RATE: 18 BRPM | DIASTOLIC BLOOD PRESSURE: 74 MMHG | TEMPERATURE: 99.1 F | HEIGHT: 63 IN | SYSTOLIC BLOOD PRESSURE: 130 MMHG

## 2018-02-01 PROCEDURE — 99212 OFFICE O/P EST SF 10 MIN: CPT | Performed by: PODIATRIST

## 2018-02-01 PROCEDURE — 99213 OFFICE O/P EST LOW 20 MIN: CPT

## 2018-02-01 ASSESSMENT — PAIN SCALES - GENERAL: PAINLEVEL_OUTOF10: 0

## 2018-02-07 ENCOUNTER — HOSPITAL ENCOUNTER (OUTPATIENT)
Age: 43
Setting detail: OUTPATIENT SURGERY
Discharge: HOME | End: 2018-02-07
Attending: PODIATRIST | Admitting: PODIATRIST
Payer: COMMERCIAL

## 2018-02-07 VITALS
OXYGEN SATURATION: 97 % | WEIGHT: 289 LBS | SYSTOLIC BLOOD PRESSURE: 144 MMHG | HEART RATE: 76 BPM | HEIGHT: 63 IN | TEMPERATURE: 97.2 F | RESPIRATION RATE: 18 BRPM | DIASTOLIC BLOOD PRESSURE: 81 MMHG | BODY MASS INDEX: 51.21 KG/M2

## 2018-02-07 PROCEDURE — 15273 SKIN SUB GRFT T/ARM/LG CHILD: CPT | Performed by: PODIATRIST

## 2018-02-07 PROCEDURE — 3600000013 HC SURGERY LEVEL 3 ADDTL 15MIN: Performed by: PODIATRIST

## 2018-02-07 PROCEDURE — A6402 STERILE GAUZE <= 16 SQ IN: HCPCS | Performed by: PODIATRIST

## 2018-02-07 PROCEDURE — 15274 SKN SUB GRFT T/A/L CHILD ADD: CPT | Performed by: PODIATRIST

## 2018-02-07 PROCEDURE — A6222 GAUZE <=16 IN NO W/SAL W/O B: HCPCS | Performed by: PODIATRIST

## 2018-02-07 PROCEDURE — 3600000003 HC SURGERY LEVEL 3 BASE: Performed by: PODIATRIST

## 2018-02-07 PROCEDURE — 2720000010 HC SURG SUPPLY STERILE: Performed by: PODIATRIST

## 2018-02-07 DEVICE — GRAFT HUM TISS NEO L7.5IN FORESKIN PROC APLIGRAF: Type: IMPLANTABLE DEVICE | Site: LEG | Status: FUNCTIONAL

## 2018-02-07 RX ORDER — SODIUM CHLORIDE 0.9 % (FLUSH) 0.9 %
10 SYRINGE (ML) INJECTION PRN
Status: DISCONTINUED | OUTPATIENT
Start: 2018-02-07 | End: 2018-02-07 | Stop reason: HOSPADM

## 2018-02-07 RX ORDER — ACETAMINOPHEN 500 MG
1000 TABLET ORAL EVERY 6 HOURS PRN
COMMUNITY

## 2018-02-07 RX ORDER — SODIUM CHLORIDE 0.9 % (FLUSH) 0.9 %
10 SYRINGE (ML) INJECTION EVERY 12 HOURS SCHEDULED
Status: DISCONTINUED | OUTPATIENT
Start: 2018-02-07 | End: 2018-02-07 | Stop reason: HOSPADM

## 2018-02-07 ASSESSMENT — PAIN - FUNCTIONAL ASSESSMENT: PAIN_FUNCTIONAL_ASSESSMENT: 0-10

## 2018-02-07 ASSESSMENT — PAIN DESCRIPTION - DESCRIPTORS: DESCRIPTORS: ACHING

## 2018-02-07 NOTE — PROGRESS NOTES
Pt received no IV sedation or anesthesia for procedure. Dressing on right foot C/D/I and pt denies any pain or discomfort. Discharge instructions given to patient, verbalized understanding, and no further questions at this time.

## 2018-02-08 NOTE — OP NOTE
[address] [address]                                 OPERATIVE REPORT    PATIENT NAME: Leonarda Ayoub                   :        1975  MED REC NO:   [id number]                              ROOM:  ACCOUNT NO:   [de-identified]                           ADMIT DATE: 2018  PROVIDER:     Eldon Odonnell    DATE OF PROCEDURE:  2018    PREOPERATIVE DIAGNOSIS:  Chronic, stagnant, granular clean full thickness  ulcerated wound, right leg, venous etiology, size 10.5 x 17.5 at its  greatest measurements. POSTOPERATIVE DIAGNOSIS:  Chronic, stagnant, granular clean full thickness  ulcerated wound, right leg, venous etiology, size 10.5 x 17.5 at its  greatest measurements. PROCEDURES:  1.  (04992) placement of bilayered skin substitute graft, Apligraf; first  100 sq cm right leg.  2.  (88177) placement of bilayered skin substitute Apligraf up to the next  100 sq cm, right leg. **NO discard / waste    SURGEON:  Dr. Eldon Odonnell. ASSISTANT:  None. ANESTHESIA:  Topical local only. OBJECTIVE:  The patient was visited in the preop holding area, where  informed consent and anatomic marking of the site were done. The patient  was transported to the minor surgery room at [facility] where  she was placed in the lateral modified dorsal recumbent position, internal  rotation of the right limb. The previous dressing was removed and the area  was sprayed with a local anesthesia prep. Subsequently, the right leg and  foot to the knee were cleansed with Hibiclens. Dry sterile dressings in  the form of en bloc draping technique was done to the right lower extremity  exposing the calf. OPERATIVE PROCEDURE:  Attention was directed to the surgical wound bed site  which was prepped with a minor amount of inspection and debridement, it was  cleansed with a gentamicin irrigation solution.   Measurements were taken at  its greatest extent and it appeared to be 10.5 x 17.5 cm. On the back table, the Apligraf bilayered skin substitute product was  reviewed. Three separate pieces at 44 sq cm per piece were noted. The pH  was acceptable as was the documentation of lot number and exploration date  etc.  Individually, on the back table they were hydrated and prepared by  the 1.5 to 1 meshing technique, they were individually placed on the area  of the open wound, 100% of the products was used with no discard. The  temporary fixation of the area was done with Steri-Strips and Adaptic after  the graft substance was placed with the epithelial layer superficial after  the Adaptic Exu-Dry absorptive compressive dressing was applied along with  some Kerlix and an Ace wrap layered combination. The patient tolerated the topical anesthesia and the procedure well without  complication and would be subsequently discharged from the same day of  surgery with the vital signs stable and afebrile in apparent satisfactory  condition. The instruments, sponge, and needle counts were correct.         Any Umana    D: 02/07/2018 17:04:30       T: 02/08/2018 1:09:03     [email or web address]  Job#: [id number]     Doc#: [id number]    CC:

## 2018-02-14 ENCOUNTER — HOSPITAL ENCOUNTER (OUTPATIENT)
Dept: WOUND CARE | Age: 43
Discharge: HOME | End: 2018-02-14
Payer: COMMERCIAL

## 2018-02-14 VITALS
TEMPERATURE: 98.4 F | HEIGHT: 63 IN | DIASTOLIC BLOOD PRESSURE: 72 MMHG | WEIGHT: 289.5 LBS | SYSTOLIC BLOOD PRESSURE: 134 MMHG | BODY MASS INDEX: 51.29 KG/M2 | HEART RATE: 93 BPM | RESPIRATION RATE: 18 BRPM

## 2018-02-14 PROCEDURE — 99213 OFFICE O/P EST LOW 20 MIN: CPT

## 2018-02-14 PROCEDURE — 99212 OFFICE O/P EST SF 10 MIN: CPT | Performed by: PODIATRIST

## 2018-02-14 ASSESSMENT — PAIN SCALES - GENERAL: PAINLEVEL_OUTOF10: 0

## 2018-02-14 NOTE — PROGRESS NOTES
per tablet 2 tabs every 6 hours as needed for pain (Patient taking differently: 1-1.5 tabs every 6 hours as needed for pain) 180 tablet 0    Ferrous Sulfate (IRON) 325 (65 FE) MG TABS Take by mouth daily  30 tablet 0     No current facility-administered medications for this encounter.       Facility-Administered Medications Ordered in Other Encounters   Medication Dose Route Frequency Provider Last Rate Last Dose    sodium chloride flush 0.9 % injection 10 mL  10 mL Intravenous PRN Laura Holiday, DPM   10 mL at 03/12/15 1326    sodium chloride flush 0.9 % injection 10 mL  10 mL Intravenous PRN Laura Holiday, DPM   10 mL at 03/01/15 0919    sodium chloride flush 0.9 % injection 10 mL  10 mL Intravenous PRN Laura Holiday, DPM        sodium chloride flush 0.9 % injection 10 mL  10 mL Intravenous PRN Laura Holiday, DPM        sodium chloride (PF) 0.9 % injection 20 mL  20 mL Intravenous BID Laura Holiday, DPM   20 mL at 02/08/15 2122       ALLERGIES    Allergies   Allergen Reactions    Avelox [Moxifloxacin] Nausea And Vomiting    Ciprofloxacin Nausea And Vomiting    Zithromax [Azithromycin] Nausea And Vomiting       PAST SURGICAL HISTORY    Past Surgical History:   Procedure Laterality Date    CARPAL TUNNEL RELEASE  2006     SECTION  1998    CYSTOSCOPY N/A 08/10/2016    with bilateral stent placement    CYSTOSCOPY  2016    left stent removal, right stent exchange, right HLL    DILATION AND CURETTAGE OF UTERUS  2017    w/ IUD placement    OTHER SURGICAL HISTORY  10/03/2016    DR BELL, ELECTIVE ILEOCAVAL RECONSTRUCTION    CA STANISLAV SKN SUB GRFT T/A/L AREA/<100SCM /<1ST 25 SCM Right 2018    WOUND CARE GRAFT PROCEDURE-APPLICATION OF APLIGRAF > 100SQ CM - LEG performed by Laura Gonzalez DPM at [address] <20SQC Right 2018    WOUND CARE GRAFT PROCEDURE, APPLICATION OF APLIGRAF performed by Laura Gonzalez DPM at [address]

## 2018-02-21 ENCOUNTER — HOSPITAL ENCOUNTER (OUTPATIENT)
Dept: WOUND CARE | Age: 43
Discharge: HOME | End: 2018-02-21
Payer: COMMERCIAL

## 2018-02-21 VITALS
TEMPERATURE: 98 F | HEART RATE: 82 BPM | RESPIRATION RATE: 20 BRPM | BODY MASS INDEX: 49.92 KG/M2 | WEIGHT: 281.8 LBS | SYSTOLIC BLOOD PRESSURE: 132 MMHG | DIASTOLIC BLOOD PRESSURE: 87 MMHG

## 2018-02-21 PROCEDURE — 99212 OFFICE O/P EST SF 10 MIN: CPT | Performed by: PODIATRIST

## 2018-02-21 PROCEDURE — 99212 OFFICE O/P EST SF 10 MIN: CPT

## 2018-02-21 ASSESSMENT — PAIN SCALES - GENERAL: PAINLEVEL_OUTOF10: 0

## 2018-03-12 ENCOUNTER — HOSPITAL ENCOUNTER (OUTPATIENT)
Age: 43
Discharge: HOME | End: 2018-03-12
Payer: COMMERCIAL

## 2018-03-12 LAB
-: ABNORMAL
ABSOLUTE EOS #: 0.11 K/UL (ref 0–0.44)
ABSOLUTE IMMATURE GRANULOCYTE: 0.06 K/UL (ref 0–0.3)
ABSOLUTE LYMPH #: 2.3 K/UL (ref 1.1–3.7)
ABSOLUTE MONO #: 0.77 K/UL (ref 0.1–1.2)
ALBUMIN SERPL-MCNC: 4.1 G/DL (ref 3.5–5.2)
ALBUMIN/GLOBULIN RATIO: 1.5 (ref 1–2.5)
ALP BLD-CCNC: 58 U/L (ref 35–104)
ALT SERPL-CCNC: 27 U/L (ref 5–33)
AMORPHOUS: ABNORMAL
ANION GAP SERPL CALCULATED.3IONS-SCNC: 13 MMOL/L (ref 9–17)
AST SERPL-CCNC: 15 U/L
BACTERIA: ABNORMAL
BASOPHILS # BLD: 0 % (ref 0–2)
BASOPHILS ABSOLUTE: 0.04 K/UL (ref 0–0.2)
BILIRUB SERPL-MCNC: 0.76 MG/DL (ref 0.3–1.2)
BILIRUBIN URINE: ABNORMAL
BUN BLDV-MCNC: 23 MG/DL (ref 6–20)
BUN/CREAT BLD: 30 (ref 9–20)
CALCIUM SERPL-MCNC: 8.9 MG/DL (ref 8.6–10.4)
CASTS UA: ABNORMAL /LPF
CHLORIDE BLD-SCNC: 95 MMOL/L (ref 98–107)
CHOLESTEROL/HDL RATIO: 4.1
CHOLESTEROL: 235 MG/DL
CO2: 27 MMOL/L (ref 20–31)
COLOR: YELLOW
COMMENT UA: ABNORMAL
CREAT SERPL-MCNC: 0.76 MG/DL (ref 0.5–0.9)
CRYSTALS, UA: ABNORMAL /HPF
DIFFERENTIAL TYPE: ABNORMAL
EOSINOPHILS RELATIVE PERCENT: 1 % (ref 1–4)
EPITHELIAL CELLS UA: ABNORMAL /HPF (ref 0–25)
GFR AFRICAN AMERICAN: >60 ML/MIN
GFR NON-AFRICAN AMERICAN: >60 ML/MIN
GFR SERPL CREATININE-BSD FRML MDRD: ABNORMAL ML/MIN/{1.73_M2}
GFR SERPL CREATININE-BSD FRML MDRD: ABNORMAL ML/MIN/{1.73_M2}
GLUCOSE BLD-MCNC: 104 MG/DL (ref 70–99)
GLUCOSE URINE: NEGATIVE
HCT VFR BLD CALC: 43 % (ref 36.3–47.1)
HDLC SERPL-MCNC: 58 MG/DL
HEMOGLOBIN: 14.1 G/DL (ref 11.9–15.1)
IMMATURE GRANULOCYTES: 1 %
KETONES, URINE: NEGATIVE
LDL CHOLESTEROL: 127 MG/DL (ref 0–130)
LEUKOCYTE ESTERASE, URINE: NEGATIVE
LYMPHOCYTES # BLD: 22 % (ref 24–43)
MAGNESIUM: 1.9 MG/DL (ref 1.6–2.6)
MCH RBC QN AUTO: 33.7 PG (ref 25.2–33.5)
MCHC RBC AUTO-ENTMCNC: 32.8 G/DL (ref 28.4–34.8)
MCV RBC AUTO: 102.9 FL (ref 82.6–102.9)
MONOCYTES # BLD: 8 % (ref 3–12)
MUCUS: ABNORMAL
NITRITE, URINE: NEGATIVE
NRBC AUTOMATED: 0 PER 100 WBC
OTHER OBSERVATIONS UA: ABNORMAL
PDW BLD-RTO: 12.9 % (ref 11.8–14.4)
PH UA: 5.5 (ref 5–9)
PLATELET # BLD: 228 K/UL (ref 138–453)
PLATELET ESTIMATE: ABNORMAL
PMV BLD AUTO: 10.1 FL (ref 8.1–13.5)
POTASSIUM SERPL-SCNC: 3.6 MMOL/L (ref 3.7–5.3)
PROTEIN UA: ABNORMAL
RBC # BLD: 4.18 M/UL (ref 3.95–5.11)
RBC # BLD: ABNORMAL 10*6/UL
RBC UA: ABNORMAL /HPF (ref 0–2)
RENAL EPITHELIAL, UA: ABNORMAL /HPF
SEG NEUTROPHILS: 68 % (ref 36–65)
SEGMENTED NEUTROPHILS ABSOLUTE COUNT: 7 K/UL (ref 1.5–8.1)
SODIUM BLD-SCNC: 135 MMOL/L (ref 135–144)
SPECIFIC GRAVITY UA: >1.03 (ref 1.01–1.02)
TOTAL PROTEIN: 6.8 G/DL (ref 6.4–8.3)
TRICHOMONAS: ABNORMAL
TRIGL SERPL-MCNC: 248 MG/DL
TURBIDITY: CLEAR
URIC ACID: 3.9 MG/DL (ref 2.4–5.7)
URINE HGB: ABNORMAL
UROBILINOGEN, URINE: NORMAL
VLDLC SERPL CALC-MCNC: ABNORMAL MG/DL (ref 1–30)
WBC # BLD: 10.3 K/UL (ref 3.5–11.3)
WBC # BLD: ABNORMAL 10*3/UL
WBC UA: ABNORMAL /HPF (ref 0–5)
YEAST: ABNORMAL

## 2018-03-12 PROCEDURE — 84550 ASSAY OF BLOOD/URIC ACID: CPT

## 2018-03-12 PROCEDURE — 80061 LIPID PANEL: CPT

## 2018-03-12 PROCEDURE — 81001 URINALYSIS AUTO W/SCOPE: CPT

## 2018-03-12 PROCEDURE — 80053 COMPREHEN METABOLIC PANEL: CPT

## 2018-03-12 PROCEDURE — 85025 COMPLETE CBC W/AUTO DIFF WBC: CPT

## 2018-03-12 PROCEDURE — 36415 COLL VENOUS BLD VENIPUNCTURE: CPT

## 2018-03-12 PROCEDURE — 83735 ASSAY OF MAGNESIUM: CPT

## 2018-03-14 ENCOUNTER — HOSPITAL ENCOUNTER (OUTPATIENT)
Dept: WOUND CARE | Age: 43
Discharge: HOME | End: 2018-03-14
Payer: COMMERCIAL

## 2018-03-14 VITALS
SYSTOLIC BLOOD PRESSURE: 146 MMHG | TEMPERATURE: 98.9 F | DIASTOLIC BLOOD PRESSURE: 92 MMHG | HEIGHT: 63 IN | HEART RATE: 100 BPM | RESPIRATION RATE: 21 BRPM

## 2018-03-14 PROCEDURE — 99213 OFFICE O/P EST LOW 20 MIN: CPT

## 2018-03-14 PROCEDURE — 99212 OFFICE O/P EST SF 10 MIN: CPT | Performed by: PODIATRIST

## 2018-03-14 NOTE — PROGRESS NOTES
Wound Management Medical Nutrition Therapy Follow-Up (chart review)    Wt Readings from Last 3 Encounters:   02/21/18 281 lb 12.8 oz (127.8 kg)   02/14/18 289 lb 8 oz (131.3 kg)   02/07/18 289 lb (131.1 kg)     Significant Weight Change- No   Unintentional- Unknown    Patient Adalimumab, Iron, acetaminophen, albuterol sulfate HFA, allopurinol, apixaban, aspirin, cycloSPORINE modified, ketorolac, metoprolol tartrate, oxyCODONE-acetaminophen, pentoxifylline, predniSONE, ranitidine, silver sulfADIAZINE, and vitamin D    Labs -   Lab Results   Component Value Date    LABALBU 4.1 03/12/2018    GLUCOSE 104 03/12/2018    LABA1C 5.3 08/12/2015    PREALBUMIN 18.0 03/26/2015     Other Labs - No results found for: VITD25    Glucometer readings at home are running N/A. Diabetes Control- not applicable    Dietary Recall Reveals- none, patient released from appointment prior to chart review. Medical Nutrition Therapy Discussed- none       Nutrition Therapy Recommendations- Would recommend use of mvi with minerals and would recommend use of Ricardo bid as well. Noted use of vit D as well as iron.   (noted previous discussion and review 12/21 with Dada Eric RDN, [facility])       Follow-up- quarterly    Electronically signed by Zandra Noble RDN, [facility] 3/14/2018, 2:31 PM      PQRS Measure: #1(Diabetes: Hemoglobin A1C Poor Control) - NA   PQRS Measure #130 (Documentation of Current Medications in Medical Record) - yes
APPLICATION OF APLIGRAF performed by Jose Hernandes DPM at [address]  06/2010    with Bx Dr Mayra Draper (Antral Erythema)       FAMILY HISTORY    Family History   Problem Relation Age of Onset    Seizures Mother     High Cholesterol Father     High Blood Pressure Father     Other Maternal Grandmother      CRF with kidney transplant    Colon Cancer Maternal Grandfather     Heart Attack Maternal Grandfather     Other Paternal Grandmother      THERESA, ASHD    Stroke Paternal Grandfather      smoker    Lung Cancer Paternal Grandfather        SOCIAL HISTORY    Social History     Social History    Marital status: Legally      Spouse name: N/A    Number of children: N/A    Years of education: N/A     Occupational History    Not on file. Social History Main Topics    Smoking status: Former Smoker     Quit date: 11/12/2010    Smokeless tobacco: Never Used    Alcohol use No    Drug use: No    Sexual activity: Not Currently     Other Topics Concern    Not on file     Social History Narrative    No narrative on file         REVIEW OF SYSTEMS    Review of Systems:       Negative for: fever/chills, headache, visual changes, chest pain, shortness of breath, nausea/vomiting/diarrhea, bruising, rash, numbness/tingling and weakness, claudication                                           Positive DVT / venous disease     Multidisciplinary assessment by members of the wound care team is carried out, including vitals and check of review of systems and accepted.     PE:            VSS, Afebrile, NAD, A&) x 3, ambulatory ; bipedal , plantigrade and propulsive           RLE ; posterior / lateral calf  -- residual FT ulceration WITHOUT biofilm                                                                  Uniform bed                                                                 TWV decreased 5% only                                                                  +PMT on direct

## 2018-04-04 ENCOUNTER — HOSPITAL ENCOUNTER (OUTPATIENT)
Dept: WOUND CARE | Age: 43
Discharge: HOME | End: 2018-04-04
Payer: COMMERCIAL

## 2018-04-04 VITALS
TEMPERATURE: 98.5 F | WEIGHT: 281 LBS | SYSTOLIC BLOOD PRESSURE: 150 MMHG | DIASTOLIC BLOOD PRESSURE: 78 MMHG | RESPIRATION RATE: 18 BRPM | BODY MASS INDEX: 49.78 KG/M2 | HEART RATE: 91 BPM

## 2018-04-04 DIAGNOSIS — E11.620 NLD (NECROBIOSIS LIPOIDICA DIABETICORUM) (HCC): ICD-10-CM

## 2018-04-04 DIAGNOSIS — L97.919 IDIOPATHIC CHRONIC VENOUS HYPERTENSION OF RIGHT LOWER EXTREMITY WITH ULCER (HCC): Primary | ICD-10-CM

## 2018-04-04 DIAGNOSIS — I87.311 IDIOPATHIC CHRONIC VENOUS HYPERTENSION OF RIGHT LOWER EXTREMITY WITH ULCER (HCC): Primary | ICD-10-CM

## 2018-04-04 PROCEDURE — 97598 DBRDMT OPN WND ADDL 20CM/<: CPT | Performed by: PODIATRIST

## 2018-04-04 PROCEDURE — 97598 DBRDMT OPN WND ADDL 20CM/<: CPT

## 2018-04-04 PROCEDURE — 97597 DBRDMT OPN WND 1ST 20 CM/<: CPT | Performed by: PODIATRIST

## 2018-04-04 PROCEDURE — 97597 DBRDMT OPN WND 1ST 20 CM/<: CPT

## 2018-04-04 ASSESSMENT — PAIN SCALES - GENERAL: PAINLEVEL_OUTOF10: 2

## 2018-04-06 ENCOUNTER — HOSPITAL ENCOUNTER (OUTPATIENT)
Dept: INFUSION THERAPY | Age: 43
Discharge: HOME | End: 2018-04-06
Payer: COMMERCIAL

## 2018-04-09 ENCOUNTER — HOSPITAL ENCOUNTER (OUTPATIENT)
Dept: INFUSION THERAPY | Age: 43
Discharge: HOME | End: 2018-04-09
Payer: COMMERCIAL

## 2018-04-09 PROCEDURE — 29580 STRAPPING UNNA BOOT: CPT

## 2018-04-09 NOTE — PROGRESS NOTES
[facility]  Rehabilitation and [facility]ness      Patient: Krunal Mcleod  Room: Room/bed info not found      Unna boot applied to Krunal Mcleod  on 4/9/2018 at 8:00 AM.    Trip Ascencio boot application to the   [x] RLE     [] LLE     [] Amanuel LEs    The following dressings were used:    [] Alginate Dressing  [] Alginate Dressing - Rope  [] Antimicrobial Dressing 4x4 (Silver Alginate)  [] Antimicrobial Dressing 6x6 (Silver Alginate)  [x] Application Multilayer Bandage (Unna boot)  [] Collagen Dressing (Tami)  [] Exudry Dressing  [] DermiSeptin (Moisturizing Cream)  [x] Ace Bandage cover  [] Other:      []  Charge sheet completed and turned into Outpatient front office staff        Signature: Raphael Adkins PTA [id number]

## 2018-04-11 ENCOUNTER — HOSPITAL ENCOUNTER (OUTPATIENT)
Dept: WOUND CARE | Age: 43
Discharge: HOME | End: 2018-04-11
Payer: COMMERCIAL

## 2018-04-11 VITALS
TEMPERATURE: 98.1 F | SYSTOLIC BLOOD PRESSURE: 158 MMHG | DIASTOLIC BLOOD PRESSURE: 108 MMHG | RESPIRATION RATE: 101 BRPM | HEART RATE: 101 BPM

## 2018-04-11 PROCEDURE — 99213 OFFICE O/P EST LOW 20 MIN: CPT

## 2018-04-11 PROCEDURE — 99212 OFFICE O/P EST SF 10 MIN: CPT | Performed by: PODIATRIST

## 2018-04-27 ENCOUNTER — OFFICE VISIT (OUTPATIENT)
Dept: FAMILY MEDICINE CLINIC | Age: 43
End: 2018-04-27
Payer: COMMERCIAL

## 2018-04-27 VITALS
SYSTOLIC BLOOD PRESSURE: 154 MMHG | HEIGHT: 63 IN | BODY MASS INDEX: 50.85 KG/M2 | DIASTOLIC BLOOD PRESSURE: 96 MMHG | WEIGHT: 287 LBS

## 2018-04-27 DIAGNOSIS — I89.0 LYMPHEDEMA: ICD-10-CM

## 2018-04-27 DIAGNOSIS — R51.9 NONINTRACTABLE HEADACHE, UNSPECIFIED CHRONICITY PATTERN, UNSPECIFIED HEADACHE TYPE: ICD-10-CM

## 2018-04-27 DIAGNOSIS — E66.01 OBESITY, MORBID (HCC): ICD-10-CM

## 2018-04-27 DIAGNOSIS — K21.9 GASTROESOPHAGEAL REFLUX DISEASE, ESOPHAGITIS PRESENCE NOT SPECIFIED: ICD-10-CM

## 2018-04-27 DIAGNOSIS — L82.1 SEBORRHEIC KERATOSES: ICD-10-CM

## 2018-04-27 DIAGNOSIS — I10 ESSENTIAL HYPERTENSION: Primary | ICD-10-CM

## 2018-04-27 PROCEDURE — 1036F TOBACCO NON-USER: CPT | Performed by: FAMILY MEDICINE

## 2018-04-27 PROCEDURE — G8417 CALC BMI ABV UP PARAM F/U: HCPCS | Performed by: FAMILY MEDICINE

## 2018-04-27 PROCEDURE — G8427 DOCREV CUR MEDS BY ELIG CLIN: HCPCS | Performed by: FAMILY MEDICINE

## 2018-04-27 PROCEDURE — 99214 OFFICE O/P EST MOD 30 MIN: CPT | Performed by: FAMILY MEDICINE

## 2018-04-27 RX ORDER — PROPRANOLOL HYDROCHLORIDE 160 MG/1
160 CAPSULE, EXTENDED RELEASE ORAL DAILY
Qty: 90 CAPSULE | Refills: 1 | Status: SHIPPED | OUTPATIENT
Start: 2018-04-27 | End: 2018-05-25 | Stop reason: SINTOL

## 2018-04-27 RX ORDER — SUMATRIPTAN 100 MG/1
100 TABLET, FILM COATED ORAL
Qty: 9 TABLET | Refills: 3 | Status: SHIPPED | OUTPATIENT
Start: 2018-04-27 | End: 2020-08-07

## 2018-04-27 ASSESSMENT — PATIENT HEALTH QUESTIONNAIRE - PHQ9
SUM OF ALL RESPONSES TO PHQ QUESTIONS 1-9: 0
2. FEELING DOWN, DEPRESSED OR HOPELESS: 0
1. LITTLE INTEREST OR PLEASURE IN DOING THINGS: 0
SUM OF ALL RESPONSES TO PHQ9 QUESTIONS 1 & 2: 0

## 2018-05-14 ENCOUNTER — HOSPITAL ENCOUNTER (OUTPATIENT)
Dept: CT IMAGING | Age: 43
Discharge: HOME | End: 2018-05-16
Payer: COMMERCIAL

## 2018-05-14 DIAGNOSIS — R51.9 NONINTRACTABLE HEADACHE, UNSPECIFIED CHRONICITY PATTERN, UNSPECIFIED HEADACHE TYPE: ICD-10-CM

## 2018-05-14 PROCEDURE — 70450 CT HEAD/BRAIN W/O DYE: CPT

## 2018-05-16 ENCOUNTER — TELEPHONE (OUTPATIENT)
Dept: FAMILY MEDICINE CLINIC | Age: 43
End: 2018-05-16

## 2018-05-18 RX ORDER — TIMOLOL MALEATE 5 MG/ML
SOLUTION/ DROPS OPHTHALMIC
Refills: 1 | COMMUNITY
Start: 2018-05-07

## 2018-05-18 RX ORDER — DIPYRIDAMOLE 75 MG
TABLET ORAL
Refills: 1 | COMMUNITY
Start: 2018-05-07 | End: 2018-12-21 | Stop reason: ALTCHOICE

## 2018-05-25 ENCOUNTER — OFFICE VISIT (OUTPATIENT)
Dept: FAMILY MEDICINE CLINIC | Age: 43
End: 2018-05-25
Payer: COMMERCIAL

## 2018-05-25 VITALS
BODY MASS INDEX: 50.5 KG/M2 | SYSTOLIC BLOOD PRESSURE: 118 MMHG | DIASTOLIC BLOOD PRESSURE: 86 MMHG | HEIGHT: 63 IN | WEIGHT: 285 LBS

## 2018-05-25 DIAGNOSIS — T78.40XA RASH DUE TO ALLERGY: Primary | ICD-10-CM

## 2018-05-25 DIAGNOSIS — R21 RASH DUE TO ALLERGY: Primary | ICD-10-CM

## 2018-05-25 DIAGNOSIS — I10 ESSENTIAL HYPERTENSION: ICD-10-CM

## 2018-05-25 DIAGNOSIS — G43.811 OTHER MIGRAINE WITH STATUS MIGRAINOSUS, INTRACTABLE: ICD-10-CM

## 2018-05-25 PROBLEM — G43.909 MIGRAINE: Status: ACTIVE | Noted: 2018-05-25

## 2018-05-25 PROCEDURE — G8417 CALC BMI ABV UP PARAM F/U: HCPCS | Performed by: FAMILY MEDICINE

## 2018-05-25 PROCEDURE — G8427 DOCREV CUR MEDS BY ELIG CLIN: HCPCS | Performed by: FAMILY MEDICINE

## 2018-05-25 PROCEDURE — 99213 OFFICE O/P EST LOW 20 MIN: CPT | Performed by: FAMILY MEDICINE

## 2018-05-25 PROCEDURE — 1036F TOBACCO NON-USER: CPT | Performed by: FAMILY MEDICINE

## 2018-05-25 RX ORDER — METOPROLOL TARTRATE 50 MG/1
50 TABLET, FILM COATED ORAL 2 TIMES DAILY
Qty: 180 TABLET | Refills: 3 | Status: SHIPPED | OUTPATIENT
Start: 2018-05-25 | End: 2019-05-10 | Stop reason: SDUPTHER

## 2018-06-28 ENCOUNTER — PATIENT MESSAGE (OUTPATIENT)
Dept: FAMILY MEDICINE CLINIC | Age: 43
End: 2018-06-28

## 2018-06-29 RX ORDER — FLUCONAZOLE 100 MG/1
TABLET ORAL
Qty: 2 TABLET | Refills: 0 | Status: SHIPPED | OUTPATIENT
Start: 2018-06-29 | End: 2018-12-21 | Stop reason: ALTCHOICE

## 2018-07-18 ENCOUNTER — HOSPITAL ENCOUNTER (OUTPATIENT)
Dept: WOUND CARE | Age: 43
Discharge: HOME | End: 2018-07-18
Payer: MEDICARE

## 2018-07-18 ENCOUNTER — HOSPITAL ENCOUNTER (OUTPATIENT)
Age: 43
Discharge: HOME | End: 2018-07-18
Payer: MEDICARE

## 2018-07-18 VITALS
WEIGHT: 272 LBS | DIASTOLIC BLOOD PRESSURE: 88 MMHG | HEART RATE: 88 BPM | BODY MASS INDEX: 48.2 KG/M2 | SYSTOLIC BLOOD PRESSURE: 162 MMHG | TEMPERATURE: 99.1 F | HEIGHT: 63 IN | RESPIRATION RATE: 18 BRPM

## 2018-07-18 DIAGNOSIS — L94.0 MORPHEA: ICD-10-CM

## 2018-07-18 LAB
-: ABNORMAL
ABSOLUTE EOS #: 0.18 K/UL (ref 0–0.44)
ABSOLUTE IMMATURE GRANULOCYTE: 0.03 K/UL (ref 0–0.3)
ABSOLUTE LYMPH #: 1.14 K/UL (ref 1.1–3.7)
ABSOLUTE MONO #: 0.5 K/UL (ref 0.1–1.2)
ALBUMIN SERPL-MCNC: 4 G/DL (ref 3.5–5.2)
ALBUMIN/GLOBULIN RATIO: 1 (ref 1–2.5)
ALP BLD-CCNC: 62 U/L (ref 35–104)
ALT SERPL-CCNC: 21 U/L (ref 5–33)
AMORPHOUS: ABNORMAL
ANION GAP SERPL CALCULATED.3IONS-SCNC: 11 MMOL/L (ref 9–17)
AST SERPL-CCNC: 27 U/L
BACTERIA: ABNORMAL
BASOPHILS # BLD: 1 % (ref 0–2)
BASOPHILS ABSOLUTE: 0.04 K/UL (ref 0–0.2)
BILIRUB SERPL-MCNC: 0.6 MG/DL (ref 0.3–1.2)
BILIRUBIN URINE: ABNORMAL
BUN BLDV-MCNC: 19 MG/DL (ref 6–20)
BUN/CREAT BLD: 26 (ref 9–20)
CALCIUM SERPL-MCNC: 9.6 MG/DL (ref 8.6–10.4)
CASTS UA: ABNORMAL /LPF
CHLORIDE BLD-SCNC: 104 MMOL/L (ref 98–107)
CHOLESTEROL/HDL RATIO: 5.7
CHOLESTEROL: 195 MG/DL
CO2: 23 MMOL/L (ref 20–31)
COLOR: YELLOW
COMMENT UA: ABNORMAL
CREAT SERPL-MCNC: 0.73 MG/DL (ref 0.5–0.9)
CRYSTALS, UA: ABNORMAL /HPF
DIFFERENTIAL TYPE: ABNORMAL
EOSINOPHILS RELATIVE PERCENT: 3 % (ref 1–4)
EPITHELIAL CELLS UA: ABNORMAL /HPF (ref 0–25)
GFR AFRICAN AMERICAN: >60 ML/MIN
GFR NON-AFRICAN AMERICAN: >60 ML/MIN
GFR SERPL CREATININE-BSD FRML MDRD: ABNORMAL ML/MIN/{1.73_M2}
GFR SERPL CREATININE-BSD FRML MDRD: ABNORMAL ML/MIN/{1.73_M2}
GLUCOSE BLD-MCNC: 118 MG/DL (ref 70–99)
GLUCOSE URINE: NEGATIVE
HCT VFR BLD CALC: 39.4 % (ref 36.3–47.1)
HDLC SERPL-MCNC: 34 MG/DL
HEMOGLOBIN: 13.2 G/DL (ref 11.9–15.1)
IMMATURE GRANULOCYTES: 0 %
KETONES, URINE: ABNORMAL
LDL CHOLESTEROL: 107 MG/DL (ref 0–130)
LEUKOCYTE ESTERASE, URINE: NEGATIVE
LYMPHOCYTES # BLD: 17 % (ref 24–43)
MAGNESIUM: 1.7 MG/DL (ref 1.6–2.6)
MCH RBC QN AUTO: 31.6 PG (ref 25.2–33.5)
MCHC RBC AUTO-ENTMCNC: 33.5 G/DL (ref 28.4–34.8)
MCV RBC AUTO: 94.3 FL (ref 82.6–102.9)
MONOCYTES # BLD: 7 % (ref 3–12)
MUCUS: ABNORMAL
NITRITE, URINE: NEGATIVE
NRBC AUTOMATED: 0 PER 100 WBC
OTHER OBSERVATIONS UA: ABNORMAL
PDW BLD-RTO: 12.5 % (ref 11.8–14.4)
PH UA: 5.5 (ref 5–9)
PLATELET # BLD: 325 K/UL (ref 138–453)
PLATELET ESTIMATE: ABNORMAL
PMV BLD AUTO: 10.3 FL (ref 8.1–13.5)
POTASSIUM SERPL-SCNC: 3.8 MMOL/L (ref 3.7–5.3)
PROTEIN UA: ABNORMAL
RBC # BLD: 4.18 M/UL (ref 3.95–5.11)
RBC # BLD: ABNORMAL 10*6/UL
RBC UA: ABNORMAL /HPF (ref 0–2)
RENAL EPITHELIAL, UA: ABNORMAL /HPF
SEG NEUTROPHILS: 72 % (ref 36–65)
SEGMENTED NEUTROPHILS ABSOLUTE COUNT: 4.89 K/UL (ref 1.5–8.1)
SODIUM BLD-SCNC: 138 MMOL/L (ref 135–144)
SPECIFIC GRAVITY UA: >1.03 (ref 1.01–1.02)
TOTAL PROTEIN: 7.9 G/DL (ref 6.4–8.3)
TRICHOMONAS: ABNORMAL
TRIGL SERPL-MCNC: 271 MG/DL
TURBIDITY: CLEAR
URIC ACID: 3.8 MG/DL (ref 2.4–5.7)
URINE HGB: ABNORMAL
UROBILINOGEN, URINE: NORMAL
VLDLC SERPL CALC-MCNC: ABNORMAL MG/DL (ref 1–30)
WBC # BLD: 6.8 K/UL (ref 3.5–11.3)
WBC # BLD: ABNORMAL 10*3/UL
WBC UA: ABNORMAL /HPF (ref 0–5)
YEAST: ABNORMAL

## 2018-07-18 PROCEDURE — 99213 OFFICE O/P EST LOW 20 MIN: CPT | Performed by: PODIATRIST

## 2018-07-18 PROCEDURE — 36415 COLL VENOUS BLD VENIPUNCTURE: CPT

## 2018-07-18 PROCEDURE — 83735 ASSAY OF MAGNESIUM: CPT

## 2018-07-18 PROCEDURE — 99213 OFFICE O/P EST LOW 20 MIN: CPT

## 2018-07-18 PROCEDURE — 86480 TB TEST CELL IMMUN MEASURE: CPT

## 2018-07-18 PROCEDURE — 81001 URINALYSIS AUTO W/SCOPE: CPT

## 2018-07-18 PROCEDURE — 85025 COMPLETE CBC W/AUTO DIFF WBC: CPT

## 2018-07-18 PROCEDURE — 80053 COMPREHEN METABOLIC PANEL: CPT

## 2018-07-18 PROCEDURE — 80061 LIPID PANEL: CPT

## 2018-07-18 PROCEDURE — 84550 ASSAY OF BLOOD/URIC ACID: CPT

## 2018-07-18 ASSESSMENT — PAIN SCALES - GENERAL: PAINLEVEL_OUTOF10: 0

## 2018-07-18 NOTE — PLAN OF CARE
Problem: Weight control:  Goal: Ability to maintain an optimal weight for height and age will be supported  Ability to maintain an optimal weight for height and age will be supported  Outcome: Ongoing

## 2018-07-21 LAB
QUANTIFERON (R) TB GOLD (INCUBATED): NEGATIVE
QUANTIFERON MITOGEN: >10 IU/ML
QUANTIFERON NIL: 0.03 IU/ML
QUANTIFERON TB AG MINUS NIL: 0 IU/ML (ref 0–0.34)

## 2018-07-23 NOTE — PROGRESS NOTES
Subjective      Viola Boyer is a 44 y.o. female who presents for evaluation. She  has  Pathology(s) &  Wound(s) which are/is located on the  R lower leg lateral, R lower leg anterior, R lower leg posterior. Cc: Right leg ; ulceration -- chronic and painful  HPI: failed interventions x numerous           IVC collapse           Venous insuff. , complicating secondary changes of morphea and NLD           topical and medicinal regime  N: \"They want to know if you would consider putting another Apligraf on\"        PAST MEDICAL HISTORY     has a past medical history of Asthma; Chronic pain syndrome; DVT (deep venous thrombosis) ([facility] [address] GERD (gastroesophageal reflux disease); Hyperlipidemia; Hypertension; Inferior vena cava obstruction; Migraine; Morbid obesity ([facility] [address] Necrobiosis lipoidica; Peripheral vascular disease ([facility] [address] Sciatica; and Venous insufficiency. MEDICATIONS    Current Outpatient Prescriptions   Medication Sig Dispense Refill    fluconazole (DIFLUCAN) 100 MG tablet Take 1 tablet, repeat if necessary after 3 days. 2 tablet 0    metoprolol tartrate (LOPRESSOR) 50 MG tablet Take 1 tablet by mouth 2 times daily 180 tablet 3    dipyridamole (PERSANTINE) 75 MG tablet TAKE 1 TABLET BY MOUTH 4 TIMES A DAY  1    timolol (TIMOPTIC) 0.5 % ophthalmic solution APPLY TO WOUND EDGE ONE DROP EVERY 2 CM JUST ONCE WEEKLY.   1    ranitidine (ZANTAC) 300 MG tablet TAKE 1 TABLET BY MOUTH 2 TIMES DAILY 180 tablet 3    allopurinol (ZYLOPRIM) 300 MG tablet TAKE 1 TABLET BY MOUTH DAILY 90 tablet 3    acetaminophen (TYLENOL) 500 MG tablet Take 1,000 mg by mouth every 6 hours as needed for Pain      vitamin D (CHOLECALCIFEROL) 1000 UNIT TABS tablet Take 1,000 Units by mouth daily      aspirin 81 MG tablet Take 1 tablet by mouth daily 90 tablet 3    apixaban (ELIQUIS) 5 MG TABS tablet Take 1 tablet by mouth 2 times daily 180 tablet 3    pentoxifylline (TRENTAL) 400 MG extended release tablet TAKE 1 TABLET BY MOUTH RECONSTRUCTION    IN STANISLAV SKN SUB GRFT T/A/L AREA/<100SCM /<1ST 25 SCM Right 2/7/2018    WOUND CARE GRAFT PROCEDURE-APPLICATION OF APLIGRAF > 100SQ CM - LEG performed by Reinaldo Aguirre DPM at [address] <20SQC Right 1/19/2018    WOUND CARE GRAFT PROCEDURE, APPLICATION OF APLIGRAF performed [facility]  06/2010    with Bx Dr Nicolasa Solis (Antral Erythema)       FAMILY HISTORY    Family History   Problem Relation Age of Onset    Seizures Mother     High Cholesterol Father     High Blood Pressure Father     Other Maternal Grandmother         CRF with kidney transplant    Colon Cancer Maternal Grandfather     Heart Attack Maternal Grandfather     Other Paternal Grandmother         THERESA, ASHD    Stroke Paternal Grandfather         smoker    Lung Cancer Paternal Grandfather        SOCIAL HISTORY    Social History     Social History    Marital status: Legally      Spouse name: N/A    Number of children: N/A    Years of education: N/A     Occupational History    Not on file. Social History Main Topics    Smoking status: Former Smoker     Quit date: 11/12/2010    Smokeless tobacco: Never Used    Alcohol use No    Drug use: No    Sexual activity: Not Currently     Other Topics Concern    Not on file     Social History Narrative    No narrative on file         REVIEW OF SYSTEMS    Review of Systems:       Negative for: fever/chills, headache, visual changes, chest pain, shortness of breath, nausea/vomiting/diarrhea, bruising, numbness/tingling and weakness,claudication,angina,                                           Positive for DVT, ulcers , and DM acquired     Multidisciplinary assessment by members of the wound care team is carried out, including vitals and check of review of systems and accepted.     PE: VSS, Afebrile, NAD, A&O x 3, ambulatory          RLE ; anterior , lateral and posterior FT+ boiled water  6. Your doctor may prescribe one of the following strengths. Measure bleach according to the chart and add to the water also:       Full Strength   1/2 Strength   1/4 Strength   1/8 Strength   Clorox   3 oz or 95 ml  3 Tbsp and 1/2   Tsp or 48 ml  1 Tbsp and 2     tsp or 24 ml  2 1/2 Tsp or 14-12 ml   Water   32 oz    32 oz   32 oz   32 oz     7. Place the solution in a sterile jar. Close it tightly with the sterile lid. Cover the entire      jar with aluminum foil to protect it from the light. Label:  Label the jar with the date and time you made the solution. Precautions:  · Keep out of the reach of children  · If the solution is used as a mouth wash do not swallow  · Do not use if you are allergic to any of the ingredients. · Stop use of the solution if your condition worsens, or rash or any other reaction develops. Call the Doctor if you have:  · Pain or burning sensation  · Rash or itching  · Redness of skin  · Swelling, hives or blisters  · Signs or symptoms of wound infection    Storage:  · Keep the solution stored at room temperature  · Cover jar with aluminum foil to protect it from the light. · Be sure the jar is tight for storing.

## 2018-08-17 ENCOUNTER — OFFICE VISIT (OUTPATIENT)
Dept: FAMILY MEDICINE CLINIC | Age: 43
End: 2018-08-17
Payer: MEDICARE

## 2018-08-17 VITALS
DIASTOLIC BLOOD PRESSURE: 62 MMHG | BODY MASS INDEX: 48.73 KG/M2 | WEIGHT: 275 LBS | SYSTOLIC BLOOD PRESSURE: 138 MMHG | HEIGHT: 63 IN

## 2018-08-17 DIAGNOSIS — I10 ESSENTIAL HYPERTENSION: ICD-10-CM

## 2018-08-17 DIAGNOSIS — E11.620 NLD (NECROBIOSIS LIPOIDICA DIABETICORUM) (HCC): Primary | ICD-10-CM

## 2018-08-17 DIAGNOSIS — E66.9 OBESITY, UNSPECIFIED CLASSIFICATION, UNSPECIFIED OBESITY TYPE, UNSPECIFIED WHETHER SERIOUS COMORBIDITY PRESENT: ICD-10-CM

## 2018-08-17 DIAGNOSIS — G43.909 MIGRAINE WITHOUT STATUS MIGRAINOSUS, NOT INTRACTABLE, UNSPECIFIED MIGRAINE TYPE: ICD-10-CM

## 2018-08-17 DIAGNOSIS — L65.9 HAIR LOSS: ICD-10-CM

## 2018-08-17 PROCEDURE — 3046F HEMOGLOBIN A1C LEVEL >9.0%: CPT | Performed by: FAMILY MEDICINE

## 2018-08-17 PROCEDURE — G8417 CALC BMI ABV UP PARAM F/U: HCPCS | Performed by: FAMILY MEDICINE

## 2018-08-17 PROCEDURE — 2022F DILAT RTA XM EVC RTNOPTHY: CPT | Performed by: FAMILY MEDICINE

## 2018-08-17 PROCEDURE — 1036F TOBACCO NON-USER: CPT | Performed by: FAMILY MEDICINE

## 2018-08-17 PROCEDURE — 99214 OFFICE O/P EST MOD 30 MIN: CPT | Performed by: FAMILY MEDICINE

## 2018-08-17 PROCEDURE — G8428 CUR MEDS NOT DOCUMENT: HCPCS | Performed by: FAMILY MEDICINE

## 2018-08-17 ASSESSMENT — PATIENT HEALTH QUESTIONNAIRE - PHQ9
SUM OF ALL RESPONSES TO PHQ QUESTIONS 1-9: 0
SUM OF ALL RESPONSES TO PHQ9 QUESTIONS 1 & 2: 0
2. FEELING DOWN, DEPRESSED OR HOPELESS: 0
1. LITTLE INTEREST OR PLEASURE IN DOING THINGS: 0
SUM OF ALL RESPONSES TO PHQ QUESTIONS 1-9: 0

## 2018-08-17 NOTE — PROGRESS NOTES
Provider, MD   vitamin D (CHOLECALCIFEROL) 1000 UNIT TABS tablet Take 1,000 Units by mouth daily   Yes Historical Provider, MD   aspirin 81 MG tablet Take 1 tablet by mouth daily 11/22/17  Yes Mario Gonzáles MD   apixaban (ELIQUIS) 5 MG TABS tablet Take 1 tablet by mouth 2 times daily 11/22/17  Yes Mario Gonzáles MD   pentoxifylline (TRENTAL) 400 MG extended release tablet TAKE 1 TABLET BY MOUTH 2 TIMES DAILY 9/29/17  Yes Mario Gonzáles MD   albuterol sulfate HFA (PROAIR HFA) 108 (90 BASE) MCG/ACT inhaler Inhale 2 puffs into the lungs every 6 hours as needed for Wheezing 6/2/17  Yes Mario Gonzáles MD   Adalimumab (HUMIRA PEN SC) Inject 40 mg into the skin every 14 days   Yes Historical Provider, MD   fluconazole (DIFLUCAN) 100 MG tablet Take 1 tablet, repeat if necessary after 3 days. 6/29/18   Mario Gonzáles MD   dipyridamole (PERSANTINE) 75 MG tablet TAKE 1 TABLET BY MOUTH 4 TIMES A DAY 5/7/18   Historical Provider, MD   SUMAtriptan (IMITREX) 100 MG tablet Take 1 tablet by mouth once as needed for Migraine 4/27/18 4/27/18  Mario Gonzáles MD   cycloSPORINE modified (NEORAL) 25 MG capsule Take 75 mg by mouth daily     Historical Provider, MD   ketorolac (TORADOL) 10 MG tablet Take 1 tablet by mouth every 6 hours as needed for Pain (maximum of 4 tablets per 24 hrs) 8/2/16 8/2/16  Claudia Hoyos MD       ROS:  General Constitutional: Denies chills. Denies fever. Denies headache, have improved. Denies lightheadedness. Ophthalmologic: Denies blurred vision. ENT: Denies nasal congestion. Denies sore throat. Denies ear pain and pressure. Respiratory: Denies cough. Denies shortness of breath. Denies wheezing. Cardiovascular: Denies chest pain at rest. Denies irregular heartbeat. Denies palpitations. Gastrointestinal: Denies abdominal pain. Denies blood in the stool. Denies constipation. Denies diarrhea. Denies nausea. Denies vomiting. Genitourinary: Denies blood in the urine. Denies difficulty urinating.

## 2018-08-17 NOTE — PROGRESS NOTES
I, FARIDA Velasquez, am scribing for and in the presence of Dr. Gisela Moore. 08/17/18 4:19 pm [address]  [address]  [place] [phone]  Dept: [phone]     Katharine Vaughn is a 44 y.o. female here for 3 Month Follow-Up and Hypertension        HPI:  HYPERTENSION:  She is not exercising, for n/a hours per week. She is not adherent to a low-salt diet. Blood pressure is not being monitored at home. She is currently taking Lopressor 50mg BID. Headaches have gotten better. She has not needed to take the imitrex.       She is still going to [place] for the wound on her right leg. It is worsening. She recently went to [place] 7/25/18, no changes made then. Dr. Carol Vera would like for her to see wound clinic there. She does follow with Dr. Bridger Del Toro here. Dr. Carol Vera wants to see if [place] wound clinic may be able to try something new.       2 rounds of skin graphs have failed, her insurance approved 10 grafts but Dr. Bridger Del Toro saw that the wound was not closing he did not want to continue.      She is still c/o hair loss. Accompanied by: daughter, Dalia Moreno Medications           Prior to Admission medications    Medication Sig Start Date End Date Taking?  Authorizing Provider   silver sulfADIAZINE (SILVADENE) 1 % cream APPLY TOPICALLY DAILY AS NEEDED (IF NEEDED) 7/30/18   Yes Gisela Moore MD   metoprolol tartrate (LOPRESSOR) 50 MG tablet Take 1 tablet by mouth 2 times daily 5/25/18   Yes Gisela Moore MD   timolol (TIMOPTIC) 0.5 % ophthalmic solution APPLY TO WOUND EDGE ONE DROP EVERY 2 CM JUST ONCE WEEKLY. 5/7/18   Yes Historical Provider, MD   ranitidine (ZANTAC) 300 MG tablet TAKE 1 TABLET BY MOUTH 2 TIMES DAILY 4/25/18   Yes Gisela Moore MD   allopurinol (ZYLOPRIM) 300 MG tablet TAKE 1 TABLET BY MOUTH DAILY 2/9/18   Yes Gisela Moore MD   acetaminophen (TYLENOL) 500 MG tablet Take 1,000 mg by mouth every 6 hours as needed for Pain     Encounters:   08/17/18 275 lb (124.7 kg)   07/18/18 272 lb (123.4 kg)   05/25/18 285 lb (129.3 kg)          BP Readings from Last 3 Encounters:   08/17/18 138/62   07/18/18 (!) 162/88   05/25/18 118/86         General Appearance: in no acute distress, well developed, well nourished. Eyes: pupils equal, round reactive to light and accommodation. Ears: normal canal and TM's. Nose: nares patent, no lesions. Oral Cavity: mucosa moist.  Throat: clear. Neck/Thyroid: neck supple, full range of motion, no cervical lymphadenopathy, no thyromegaly or carotid bruits. Skin: warm and dry. No suspicious lesions. Heart: regular rate and rhythm. No murmurs. S1, S2 normal, no gallops. Lungs: clear to auscultation bilaterally. Abdomen: bowel sounds present, soft, nontender, nondistended, no masses or organomegaly. Morbidly obese. Musculoskeletal: normal, full range of motion in knees and hips, no swelling or tenderness. Extremities: no cyanosis or edema. Peripheral Pulses: 2+ throughout, symetric. Neurologic: nonfocal, motor strength normal upper and lower extremities, sensory exam intact. Psych: normal affect, speech fluent.     ASSESSMENT:   Diagnosis Orders   1. NLD (necrobiosis lipoidica diabeticorum) (HCC) Worsening     Worsening based upon symptoms and exam; still going to [place] for the wound on her right leg. It is worsening; has had many failed treatments;   2. Essential hypertension     3. Hair loss  T4    TSH without Reflex    DHEA    Testosterone   4. Migraine without status migrainosus, not intractable, unspecified migraine type     5. Obesity, unspecified classification, unspecified obesity type, unspecified whether serious comorbidity present            PLAN:  We discuss her continued NLD and the wound on her right leg. She has had little benefit and many failed treatments. She continues to have open area but this has not grown and changed in depth.      I review her most recent UA results from [place]. We know that she still has a kidney stone in the kidney and she is on blood thinners. As long as she is not having gross hematuria, I don't think that there is a reason to go looking for things. Headaches have been controlled with the lopressor 50 mg bid dose. diffuse hair loss can be hormonal, or patchy loss could be immunologic. We discuss the telogenic phase of hair loss and how this can occur for 6 months following a stressful event. I encourage to speak with [place] about this to see if one of the medications could be contributing. I will also get thyroid levels, DHEA, and a testosterone level. She is stable. I will see her in 3 months. No orders of the defined types were placed in this encounter.       Encounter Medications        I, Dr. Savage Madden, personally performed the services described in this documentation as scribed by FARIDA Mora in my presence, and it is both accurate and complete. 1. NLD (necrobiosis lipoidica diabeticorum) (HCC)  Worsening based upon symptoms and exam; still going to [place] for the wound on her right leg. It is worsening; has had many failed treatments; continues to have open area but this has not grown and changed in depth.

## 2018-10-25 ENCOUNTER — HOSPITAL ENCOUNTER (OUTPATIENT)
Dept: WOUND CARE | Age: 43
Discharge: HOME | End: 2018-10-25
Payer: MEDICARE

## 2018-10-25 VITALS
HEART RATE: 71 BPM | SYSTOLIC BLOOD PRESSURE: 135 MMHG | WEIGHT: 270.9 LBS | RESPIRATION RATE: 18 BRPM | TEMPERATURE: 98.1 F | DIASTOLIC BLOOD PRESSURE: 73 MMHG | BODY MASS INDEX: 47.99 KG/M2

## 2018-10-25 PROCEDURE — 99213 OFFICE O/P EST LOW 20 MIN: CPT | Performed by: PODIATRIST

## 2018-10-25 PROCEDURE — 99213 OFFICE O/P EST LOW 20 MIN: CPT

## 2018-10-25 RX ORDER — M-VIT,TX,IRON,MINS/CALC/FOLIC 27MG-0.4MG
1 TABLET ORAL DAILY
COMMUNITY

## 2018-10-25 ASSESSMENT — PAIN SCALES - GENERAL: PAINLEVEL_OUTOF10: 0

## 2018-10-25 NOTE — PROGRESS NOTES
Subjective      Papo Farley is a 44 y.o. female who presents for evaluation. She  has  Wound(s) which are/is located on the  R lower leg medial, R lower leg lateral, R lower leg posterior. MRR: specialized dressing care              Ricardo Pr powder supplement              Venous stent ; time period long ago ; good as is going to get      PAST MEDICAL HISTORY     has a past medical history of Asthma; Chronic pain syndrome; DVT (deep venous thrombosis) ([address] GERD (gastroesophageal reflux disease); Hyperlipidemia; Hypertension; Inferior vena cava obstruction; Migraine; Morbid obesity ([address] Necrobiosis lipoidica; Peripheral vascular disease ([facility] [address] Sciatica; and Venous insufficiency. MEDICATIONS    Current Outpatient Prescriptions   Medication Sig Dispense Refill    Multiple Vitamins-Minerals (THERAPEUTIC MULTIVITAMIN-MINERALS) tablet Take 1 tablet by mouth daily      pentoxifylline (TRENTAL) 400 MG extended release tablet TAKE 1 TABLET BY MOUTH 2 TIMES DAILY 180 tablet 3    silver sulfADIAZINE (SILVADENE) 1 % cream APPLY TOPICALLY DAILY AS NEEDED (IF NEEDED) 1000 g 1    metoprolol tartrate (LOPRESSOR) 50 MG tablet Take 1 tablet by mouth 2 times daily 180 tablet 3    timolol (TIMOPTIC) 0.5 % ophthalmic solution APPLY TO WOUND EDGE ONE DROP EVERY 2 CM JUST ONCE WEEKLY.   1    ranitidine (ZANTAC) 300 MG tablet TAKE 1 TABLET BY MOUTH 2 TIMES DAILY 180 tablet 3    allopurinol (ZYLOPRIM) 300 MG tablet TAKE 1 TABLET BY MOUTH DAILY 90 tablet 3    acetaminophen (TYLENOL) 500 MG tablet Take 1,000 mg by mouth every 6 hours as needed for Pain      vitamin D (CHOLECALCIFEROL) 1000 UNIT TABS tablet Take 1,000 Units by mouth daily      aspirin 81 MG tablet Take 1 tablet by mouth daily 90 tablet 3    apixaban (ELIQUIS) 5 MG TABS tablet Take 1 tablet by mouth 2 times daily 180 tablet 3    albuterol sulfate HFA (PROAIR HFA) 108 (90 BASE) MCG/ACT inhaler Inhale 2 puffs into the lungs every 6 hours as needed

## 2018-11-14 ENCOUNTER — NURSE ONLY (OUTPATIENT)
Dept: FAMILY MEDICINE CLINIC | Age: 43
End: 2018-11-14
Payer: MEDICARE

## 2018-11-14 DIAGNOSIS — Z23 NEEDS FLU SHOT: Primary | ICD-10-CM

## 2018-11-14 PROCEDURE — 90688 IIV4 VACCINE SPLT 0.5 ML IM: CPT | Performed by: FAMILY MEDICINE

## 2018-11-14 PROCEDURE — G0008 ADMIN INFLUENZA VIRUS VAC: HCPCS | Performed by: FAMILY MEDICINE

## 2018-12-21 ENCOUNTER — OFFICE VISIT (OUTPATIENT)
Dept: FAMILY MEDICINE CLINIC | Age: 43
End: 2018-12-21
Payer: MEDICARE

## 2018-12-21 VITALS
SYSTOLIC BLOOD PRESSURE: 120 MMHG | DIASTOLIC BLOOD PRESSURE: 68 MMHG | WEIGHT: 274.4 LBS | BODY MASS INDEX: 48.62 KG/M2 | HEIGHT: 63 IN

## 2018-12-21 DIAGNOSIS — E11.620 NLD (NECROBIOSIS LIPOIDICA DIABETICORUM) (HCC): ICD-10-CM

## 2018-12-21 DIAGNOSIS — E66.9 OBESITY, UNSPECIFIED CLASSIFICATION, UNSPECIFIED OBESITY TYPE, UNSPECIFIED WHETHER SERIOUS COMORBIDITY PRESENT: ICD-10-CM

## 2018-12-21 DIAGNOSIS — D68.59 HYPERCOAGULABLE STATE (HCC): Primary | ICD-10-CM

## 2018-12-21 DIAGNOSIS — G43.811 OTHER MIGRAINE WITH STATUS MIGRAINOSUS, INTRACTABLE: ICD-10-CM

## 2018-12-21 DIAGNOSIS — I10 ESSENTIAL HYPERTENSION: ICD-10-CM

## 2018-12-21 PROCEDURE — 99214 OFFICE O/P EST MOD 30 MIN: CPT | Performed by: FAMILY MEDICINE

## 2018-12-21 PROCEDURE — 2022F DILAT RTA XM EVC RTNOPTHY: CPT | Performed by: FAMILY MEDICINE

## 2018-12-21 PROCEDURE — G8417 CALC BMI ABV UP PARAM F/U: HCPCS | Performed by: FAMILY MEDICINE

## 2018-12-21 PROCEDURE — 3046F HEMOGLOBIN A1C LEVEL >9.0%: CPT | Performed by: FAMILY MEDICINE

## 2018-12-21 PROCEDURE — G8427 DOCREV CUR MEDS BY ELIG CLIN: HCPCS | Performed by: FAMILY MEDICINE

## 2018-12-21 PROCEDURE — 1036F TOBACCO NON-USER: CPT | Performed by: FAMILY MEDICINE

## 2018-12-21 PROCEDURE — G8482 FLU IMMUNIZE ORDER/ADMIN: HCPCS | Performed by: FAMILY MEDICINE

## 2018-12-21 NOTE — PROGRESS NOTES
depressed mood.     Past Surgical History:   Procedure Laterality Date    CARPAL TUNNEL RELEASE  2006     SECTION  1998    CYSTOSCOPY N/A 08/10/2016    with bilateral stent placement    CYSTOSCOPY  2016    left stent removal, right stent exchange, right HLL    DILATION AND CURETTAGE OF UTERUS  2017    w/ IUD placement    OTHER SURGICAL HISTORY  10/03/2016    DR BELL, ELECTIVE ILEOCAVAL RECONSTRUCTION    CT STANISLAV SKN SUB GRFT T/A/L AREA/<100SCM /<1ST 25 SCM Right 2018    WOUND CARE GRAFT PROCEDURE-APPLICATION OF APLIGRAF > 100SQ CM - LEG performed by Rashi Malone DPM at [facility] <20SQC Right 2018    WOUND CARE GRAFT PROCEDURE, APPLICATION OF APLIGRAF performed by Rashi Malone DPM at [address]  2010    with Bx Dr Sandra Mccartney (Antral Erythema)       Family History   Problem Relation Age of Onset    Seizures Mother     High Cholesterol Father     High Blood Pressure Father     Other Maternal Grandmother         CRF with kidney transplant    Colon Cancer Maternal Grandfather     Heart Attack Maternal Grandfather     Other Paternal Grandmother         THERESA, ASHD    Stroke Paternal Grandfather         smoker    Lung Cancer Paternal Grandfather        Past Medical History:   Diagnosis Date    Asthma     seasonal    Chronic pain syndrome 2013    DVT (deep venous thrombosis) ([address] 1998, 2013    GERD (gastroesophageal reflux disease) 2006    Hyperlipidemia 2011    Hypertension 2014    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     Migraine 2018    Morbid obesity ([address] 2013    Necrobiosis lipoidica 2014    Peripheral vascular disease ([address]     history of DVT,s    Sciatica 3/10/2017    Venous insufficiency 2013      Social History   Substance Use Topics    Smoking status: Former Smoker     Quit date: 2010    Smokeless tobacco: Never Used    Alcohol use No      Current Outpatient Prescriptions   Medication Sig Dispense Refill    Multiple Vitamins-Minerals (THERAPEUTIC MULTIVITAMIN-MINERALS) tablet Take 1 tablet by mouth daily      pentoxifylline (TRENTAL) 400 MG extended release tablet TAKE 1 TABLET BY MOUTH 2 TIMES DAILY 180 tablet 3    silver sulfADIAZINE (SILVADENE) 1 % cream APPLY TOPICALLY DAILY AS NEEDED (IF NEEDED) 1000 g 1    fluconazole (DIFLUCAN) 100 MG tablet Take 1 tablet, repeat if necessary after 3 days. 2 tablet 0    metoprolol tartrate (LOPRESSOR) 50 MG tablet Take 1 tablet by mouth 2 times daily 180 tablet 3    dipyridamole (PERSANTINE) 75 MG tablet TAKE 1 TABLET BY MOUTH 4 TIMES A DAY  1    timolol (TIMOPTIC) 0.5 % ophthalmic solution APPLY TO WOUND EDGE ONE DROP EVERY 2 CM JUST ONCE WEEKLY. 1    SUMAtriptan (IMITREX) 100 MG tablet Take 1 tablet by mouth once as needed for Migraine 9 tablet 3    ranitidine (ZANTAC) 300 MG tablet TAKE 1 TABLET BY MOUTH 2 TIMES DAILY 180 tablet 3    allopurinol (ZYLOPRIM) 300 MG tablet TAKE 1 TABLET BY MOUTH DAILY 90 tablet 3    acetaminophen (TYLENOL) 500 MG tablet Take 1,000 mg by mouth every 6 hours as needed for Pain      vitamin D (CHOLECALCIFEROL) 1000 UNIT TABS tablet Take 1,000 Units by mouth daily      aspirin 81 MG tablet Take 1 tablet by mouth daily 90 tablet 3    apixaban (ELIQUIS) 5 MG TABS tablet Take 1 tablet by mouth 2 times daily 180 tablet 3    cycloSPORINE modified (NEORAL) 25 MG capsule Take 75 mg by mouth daily       albuterol sulfate HFA (PROAIR HFA) 108 (90 BASE) MCG/ACT inhaler Inhale 2 puffs into the lungs every 6 hours as needed for Wheezing 1 Inhaler 2    Adalimumab (HUMIRA PEN SC) Inject 40 mg into the skin every 14 days       No current facility-administered medications for this visit.       Facility-Administered Medications Ordered in Other Visits   Medication Dose Route Frequency Provider Last Rate Last Dose    sodium affect, speech fluent. ASSESSMENT:  {No diagnosis found. (Refresh or delete this SmartLink)}    PLAN:  ***  No orders of the defined types were placed in this encounter. No orders of the defined types were placed in this encounter.

## 2018-12-21 NOTE — PROGRESS NOTES
Expand All Collapse All    I, TFARIDA Zayas, am scribing for and in the presence of Dr. Amberly Cobb. 12/21/18 4:07 pm [address]  [place] [phone]  Dept: [phone]     Red Rich is a 44 y.o. female here for 4 month f/u HTN        HPI:  HYPERTENSION:  She is not exercising, for n/a hours per week. She is not adherent to a low-salt diet.    Blood pressure is not being monitored at home. Tamara Warner is currently taking Lopressor 50mg BID.  Headaches have gotten better. She has not needed to take the imitrex. Roxi Proctor has recently seen Dr. Celena Morris, she will have an appt in June so that he can do an abdominal doppler and Dr. Arleen Bey, she was advised that she could continue to keep the IUD     Home Medications           Prior to Admission medications    Medication Sig Start Date End Date Taking? Authorizing Provider   Multiple Vitamins-Minerals (THERAPEUTIC MULTIVITAMIN-MINERALS) tablet Take 1 tablet by mouth daily       Historical Provider, MD   pentoxifylline (TRENTAL) 400 MG extended release tablet TAKE 1 TABLET BY MOUTH 2 TIMES DAILY 9/21/18     Amberly Cobb MD   silver sulfADIAZINE (SILVADENE) 1 % cream APPLY TOPICALLY DAILY AS NEEDED (IF NEEDED) 7/30/18     Amberly Cobb MD   fluconazole (DIFLUCAN) 100 MG tablet Take 1 tablet, repeat if necessary after 3 days.  6/29/18     Amberly Cobb MD   metoprolol tartrate (LOPRESSOR) 50 MG tablet Take 1 tablet by mouth 2 times daily 5/25/18     Amberly Cobb MD   dipyridamole (PERSANTINE) 75 MG tablet TAKE 1 TABLET BY MOUTH 4 TIMES A DAY 5/7/18     Historical Provider, MD   timolol (TIMOPTIC) 0.5 % ophthalmic solution APPLY TO WOUND EDGE ONE DROP EVERY 2 CM JUST ONCE WEEKLY. 5/7/18     Historical Provider, MD   SUMAtriptan (IMITREX) 100 MG tablet Take 1 tablet by mouth once as needed for Migraine 4/27/18 4/27/18   Amberly Cobb MD   ranitidine (ZANTAC) 300 MG tablet TAKE 1 TABLET BY

## 2019-02-12 RX ORDER — ALLOPURINOL 300 MG/1
300 TABLET ORAL DAILY
Qty: 90 TABLET | Refills: 3 | Status: SHIPPED | OUTPATIENT
Start: 2019-02-12 | End: 2020-03-16

## 2019-02-21 ENCOUNTER — HOSPITAL ENCOUNTER (OUTPATIENT)
Dept: WOUND CARE | Age: 44
Discharge: HOME | End: 2019-02-21
Payer: MEDICARE

## 2019-02-21 VITALS
HEART RATE: 67 BPM | RESPIRATION RATE: 18 BRPM | TEMPERATURE: 98.8 F | SYSTOLIC BLOOD PRESSURE: 146 MMHG | DIASTOLIC BLOOD PRESSURE: 94 MMHG

## 2019-02-21 PROCEDURE — 99212 OFFICE O/P EST SF 10 MIN: CPT | Performed by: PODIATRIST

## 2019-02-21 PROCEDURE — 99213 OFFICE O/P EST LOW 20 MIN: CPT

## 2019-02-21 ASSESSMENT — PAIN SCALES - GENERAL: PAINLEVEL_OUTOF10: 3

## 2019-03-11 ENCOUNTER — HOSPITAL ENCOUNTER (OUTPATIENT)
Age: 44
Discharge: HOME | End: 2019-03-11
Payer: MEDICARE

## 2019-03-11 DIAGNOSIS — D68.59 HYPERCOAGULABLE STATE (HCC): ICD-10-CM

## 2019-03-11 LAB
-: NORMAL
ABSOLUTE EOS #: 0.24 K/UL (ref 0–0.44)
ABSOLUTE IMMATURE GRANULOCYTE: 0.03 K/UL (ref 0–0.3)
ABSOLUTE LYMPH #: 1.39 K/UL (ref 1.1–3.7)
ABSOLUTE MONO #: 0.61 K/UL (ref 0.1–1.2)
ALBUMIN SERPL-MCNC: 4.1 G/DL
ALBUMIN SERPL-MCNC: 4.1 G/DL (ref 3.5–5.2)
ALBUMIN/GLOBULIN RATIO: 1.3 (ref 1–2.5)
ALP BLD-CCNC: 71 U/L
ALP BLD-CCNC: 71 U/L (ref 35–104)
ALT SERPL-CCNC: 30 U/L
ALT SERPL-CCNC: 33 U/L (ref 5–33)
AMORPHOUS: NORMAL
ANION GAP SERPL CALCULATED.3IONS-SCNC: 12 MMOL/L
ANION GAP SERPL CALCULATED.3IONS-SCNC: 12 MMOL/L (ref 9–17)
AST SERPL-CCNC: 30 U/L
AST SERPL-CCNC: 33 U/L
BACTERIA: NORMAL
BASOPHILS # BLD: 1 % (ref 0–2)
BASOPHILS ABSOLUTE: 0.04 /ΜL
BASOPHILS ABSOLUTE: 0.04 K/UL (ref 0–0.2)
BASOPHILS RELATIVE PERCENT: 1 %
BILIRUB SERPL-MCNC: 0.64 MG/DL (ref 0.1–1.4)
BILIRUB SERPL-MCNC: 0.64 MG/DL (ref 0.3–1.2)
BILIRUBIN URINE: NEGATIVE
BILIRUBIN, URINE: NORMAL
BLOOD, URINE: NORMAL
BUN BLDV-MCNC: 13 MG/DL
BUN BLDV-MCNC: 13 MG/DL (ref 6–20)
BUN/CREAT BLD: 21 (ref 9–20)
CALCIUM SERPL-MCNC: 9.3 MG/DL
CALCIUM SERPL-MCNC: 9.3 MG/DL (ref 8.6–10.4)
CASTS UA: NORMAL /LPF
CHLORIDE BLD-SCNC: 102 MMOL/L
CHLORIDE BLD-SCNC: 102 MMOL/L (ref 98–107)
CHOLESTEROL, TOTAL: 201 MG/DL
CHOLESTEROL/HDL RATIO: 4.8
CHOLESTEROL/HDL RATIO: 4.8
CHOLESTEROL: 201 MG/DL
CLARITY: NORMAL
CO2: 22 MMOL/L
CO2: 22 MMOL/L (ref 20–31)
COLOR: NORMAL
COLOR: YELLOW
COMMENT UA: ABNORMAL
CREAT SERPL-MCNC: 0.62 MG/DL
CREAT SERPL-MCNC: 0.62 MG/DL (ref 0.5–0.9)
CRYSTALS, UA: NORMAL /HPF
DIFFERENTIAL TYPE: ABNORMAL
EOSINOPHILS ABSOLUTE: 0.02 /ΜL
EOSINOPHILS RELATIVE PERCENT: 3 %
EOSINOPHILS RELATIVE PERCENT: 3 % (ref 1–4)
EPITHELIAL CELLS UA: NORMAL /HPF (ref 0–25)
GFR AFRICAN AMERICAN: >60 ML/MIN
GFR CALCULATED: NORMAL
GFR NON-AFRICAN AMERICAN: >60 ML/MIN
GFR SERPL CREATININE-BSD FRML MDRD: ABNORMAL ML/MIN/{1.73_M2}
GFR SERPL CREATININE-BSD FRML MDRD: ABNORMAL ML/MIN/{1.73_M2}
GLUCOSE BLD-MCNC: 99 MG/DL
GLUCOSE BLD-MCNC: 99 MG/DL (ref 70–99)
GLUCOSE URINE: NEGATIVE
GLUCOSE URINE: NORMAL
HCT VFR BLD CALC: 41.6 % (ref 36.3–47.1)
HCT VFR BLD CALC: 41.6 % (ref 36–46)
HDLC SERPL-MCNC: 42 MG/DL
HDLC SERPL-MCNC: 42 MG/DL (ref 35–70)
HEMOGLOBIN: 14 G/DL (ref 11.9–15.1)
HEMOGLOBIN: 14 G/DL (ref 12–16)
IMMATURE GRANULOCYTES: 0 %
KETONES, URINE: NEGATIVE
KETONES, URINE: NORMAL
LDL CHOLESTEROL CALCULATED: 94 MG/DL (ref 0–160)
LDL CHOLESTEROL: 94 MG/DL (ref 0–130)
LEUKOCYTE ESTERASE, URINE: NEGATIVE
LEUKOCYTE ESTERASE, URINE: NORMAL
LYMPHOCYTES # BLD: 16 % (ref 24–43)
LYMPHOCYTES ABSOLUTE: 1.39 /ΜL
LYMPHOCYTES RELATIVE PERCENT: 16 %
MAGNESIUM: 1.8 MG/DL
MAGNESIUM: 1.8 MG/DL (ref 1.6–2.6)
MCH RBC QN AUTO: 32.3 PG
MCH RBC QN AUTO: 32.3 PG (ref 25.2–33.5)
MCHC RBC AUTO-ENTMCNC: 33.7 G/DL
MCHC RBC AUTO-ENTMCNC: 33.7 G/DL (ref 28.4–34.8)
MCV RBC AUTO: 96.1 FL
MCV RBC AUTO: 96.1 FL (ref 82.6–102.9)
MONOCYTES # BLD: 7 % (ref 3–12)
MONOCYTES ABSOLUTE: 0.61 /ΜL
MONOCYTES RELATIVE PERCENT: 7 %
MUCUS: NORMAL
NEUTROPHILS ABSOLUTE: 6.52 /ΜL
NEUTROPHILS RELATIVE PERCENT: 73 %
NITRITE, URINE: NEGATIVE
NITRITE, URINE: NORMAL
NRBC AUTOMATED: 0 PER 100 WBC
OTHER OBSERVATIONS UA: NORMAL
PDW BLD-RTO: 12.9 % (ref 11.8–14.4)
PH UA: 5 (ref 5–9)
PH UA: NORMAL (ref 4.5–8)
PLATELET # BLD: 245 K/UL (ref 138–453)
PLATELET # BLD: 245 K/ΜL
PLATELET ESTIMATE: ABNORMAL
PMV BLD AUTO: 10.9 FL
PMV BLD AUTO: 10.9 FL (ref 8.1–13.5)
POTASSIUM SERPL-SCNC: 4.3 MMOL/L
POTASSIUM SERPL-SCNC: 4.3 MMOL/L (ref 3.7–5.3)
PROTEIN UA: NEGATIVE
PROTEIN UA: NORMAL
RBC # BLD: 0.03 10^6/ΜL
RBC # BLD: 4.33 M/UL (ref 3.95–5.11)
RBC # BLD: ABNORMAL 10*6/UL
RBC UA: NORMAL /HPF (ref 0–2)
RENAL EPITHELIAL, UA: NORMAL /HPF
SEG NEUTROPHILS: 73 % (ref 36–65)
SEGMENTED NEUTROPHILS ABSOLUTE COUNT: 6.52 K/UL (ref 1.5–8.1)
SODIUM BLD-SCNC: 136 MMOL/L
SODIUM BLD-SCNC: 136 MMOL/L (ref 135–144)
SPECIFIC GRAVITY UA: 1.02 (ref 1.01–1.02)
SPECIFIC GRAVITY, URINE: NORMAL
TOTAL PROTEIN: 7.2
TOTAL PROTEIN: 7.2 G/DL (ref 6.4–8.3)
TRICHOMONAS: NORMAL
TRIGL SERPL-MCNC: 327 MG/DL
TRIGL SERPL-MCNC: 327 MG/DL
TURBIDITY: CLEAR
URIC ACID: 4.1
URIC ACID: 4.1 MG/DL (ref 2.4–5.7)
URINE HGB: ABNORMAL
UROBILINOGEN, URINE: NORMAL
UROBILINOGEN, URINE: NORMAL
VLDLC SERPL CALC-MCNC: ABNORMAL MG/DL (ref 1–30)
VLDLC SERPL CALC-MCNC: NORMAL MG/DL
WBC # BLD: 8.8 10^3/ML
WBC # BLD: 8.8 K/UL (ref 3.5–11.3)
WBC # BLD: ABNORMAL 10*3/UL
WBC UA: NORMAL /HPF (ref 0–5)
YEAST: NORMAL

## 2019-03-11 PROCEDURE — 83735 ASSAY OF MAGNESIUM: CPT

## 2019-03-11 PROCEDURE — 85025 COMPLETE CBC W/AUTO DIFF WBC: CPT

## 2019-03-11 PROCEDURE — 36415 COLL VENOUS BLD VENIPUNCTURE: CPT

## 2019-03-11 PROCEDURE — 84550 ASSAY OF BLOOD/URIC ACID: CPT

## 2019-03-11 PROCEDURE — 81001 URINALYSIS AUTO W/SCOPE: CPT

## 2019-03-11 PROCEDURE — 80061 LIPID PANEL: CPT

## 2019-03-11 PROCEDURE — 80053 COMPREHEN METABOLIC PANEL: CPT

## 2019-03-18 DIAGNOSIS — K21.00 GASTROESOPHAGEAL REFLUX DISEASE WITH ESOPHAGITIS: ICD-10-CM

## 2019-03-18 RX ORDER — RANITIDINE 300 MG/1
TABLET ORAL
Qty: 180 TABLET | Refills: 3 | Status: SHIPPED | OUTPATIENT
Start: 2019-03-18 | End: 2020-03-16

## 2019-04-26 ENCOUNTER — OFFICE VISIT (OUTPATIENT)
Dept: FAMILY MEDICINE CLINIC | Age: 44
End: 2019-04-26
Payer: MEDICARE

## 2019-04-26 VITALS
SYSTOLIC BLOOD PRESSURE: 124 MMHG | BODY MASS INDEX: 49.79 KG/M2 | WEIGHT: 281 LBS | DIASTOLIC BLOOD PRESSURE: 68 MMHG | HEIGHT: 63 IN

## 2019-04-26 DIAGNOSIS — I10 ESSENTIAL HYPERTENSION: ICD-10-CM

## 2019-04-26 DIAGNOSIS — R73.9 HYPERGLYCEMIA: ICD-10-CM

## 2019-04-26 DIAGNOSIS — M54.16 LUMBAR RADICULOPATHY: Primary | ICD-10-CM

## 2019-04-26 DIAGNOSIS — E11.620 NLD (NECROBIOSIS LIPOIDICA DIABETICORUM) (HCC): ICD-10-CM

## 2019-04-26 DIAGNOSIS — Z86.718 HISTORY OF DVT (DEEP VEIN THROMBOSIS): ICD-10-CM

## 2019-04-26 PROCEDURE — 99213 OFFICE O/P EST LOW 20 MIN: CPT | Performed by: FAMILY MEDICINE

## 2019-04-26 PROCEDURE — 2022F DILAT RTA XM EVC RTNOPTHY: CPT | Performed by: FAMILY MEDICINE

## 2019-04-26 PROCEDURE — 3046F HEMOGLOBIN A1C LEVEL >9.0%: CPT | Performed by: FAMILY MEDICINE

## 2019-04-26 PROCEDURE — 1036F TOBACCO NON-USER: CPT | Performed by: FAMILY MEDICINE

## 2019-04-26 PROCEDURE — G8417 CALC BMI ABV UP PARAM F/U: HCPCS | Performed by: FAMILY MEDICINE

## 2019-04-26 PROCEDURE — G8427 DOCREV CUR MEDS BY ELIG CLIN: HCPCS | Performed by: FAMILY MEDICINE

## 2019-04-26 RX ORDER — PREGABALIN 50 MG/1
50 CAPSULE ORAL 2 TIMES DAILY
Qty: 60 CAPSULE | Refills: 3 | Status: SHIPPED | OUTPATIENT
Start: 2019-04-26 | End: 2019-08-28 | Stop reason: SDUPTHER

## 2019-04-26 NOTE — PROGRESS NOTES
I, Alexander [facility], am scribing for and in the presence of Dr. Juan Carlos Nava. 4/26/19/3:44 pm/JML    [address] [address]  [place] [id number]  Dept: [phone]    Zoë Cabrales is a 44 y.o. female here for Follow-up (4 month regular check) and Hypertension      HPI:  HYPERTENSION:  She is not exercising, for n/a hours per week. She is not adherent to a low-salt diet.    Blood pressure is not being monitored at home. Rd Jeffries is currently taking Lopressor 50mg BID. Prior to Admission medications    Medication Sig Start Date End Date Taking? Authorizing Provider   apixaban (ELIQUIS) 5 MG TABS tablet Take 1 tablet by mouth 2 times daily 4/26/19  Yes Juan Carlos Nava MD   Handicap Placard MISC by Does not apply route DDD - permanent 4/26/19  Yes Juan Carlos Nava MD   pregabalin (LYRICA) 50 MG capsule Take 1 capsule by mouth 2 times daily for 30 days.  4/26/19 5/26/19 Yes Juan Carlos Nava MD   ranitidine (ZANTAC) 300 MG tablet TAKE 1 TABLET BY MOUTH 2 TIMES DAILY 3/18/19  Yes Juan Carlos Nava MD   allopurinol (ZYLOPRIM) 300 MG tablet TAKE 1 TABLET BY MOUTH DAILY 2/12/19  Yes Juan Carlos Nava MD   Multiple Vitamins-Minerals (THERAPEUTIC MULTIVITAMIN-MINERALS) tablet Take 1 tablet by mouth daily   Yes Historical Provider, MD   pentoxifylline (TRENTAL) 400 MG extended release tablet TAKE 1 TABLET BY MOUTH 2 TIMES DAILY 9/21/18  Yes Juan Carlos Nava MD   silver sulfADIAZINE (SILVADENE) 1 % cream APPLY TOPICALLY DAILY AS NEEDED (IF NEEDED) 7/30/18  Yes Juan Carlos Nava MD   metoprolol tartrate (LOPRESSOR) 50 MG tablet Take 1 tablet by mouth 2 times daily 5/25/18  Yes Juan Carlos Nava MD   timolol (TIMOPTIC) 0.5 % ophthalmic solution APPLY TO WOUND EDGE ONE DROP EVERY 2 CM JUST ONCE WEEKLY. 5/7/18  Yes Historical Provider, MD   SUMAtriptan (IMITREX) 100 MG tablet Take 1 tablet by mouth once as needed for Migraine 4/27/18 4/26/19 Yes Juan Carlos Nava MD   acetaminophen (TYLENOL) 500 MG tablet Take 1,000 mg by mouth every 6 hours as needed for Pain   Yes Historical Provider, MD   vitamin D (CHOLECALCIFEROL) 1000 UNIT TABS tablet Take 1,000 Units by mouth daily   Yes Historical Provider, MD   aspirin 81 MG tablet Take 1 tablet by mouth daily 11/22/17  Yes Uday Mckeon MD   albuterol sulfate HFA (PROAIR HFA) 108 (90 BASE) MCG/ACT inhaler Inhale 2 puffs into the lungs every 6 hours as needed for Wheezing 6/2/17  Yes Uday Mckeon MD   Adalimumab (HUMIRA PEN SC) Inject 40 mg into the skin every 14 days   Yes Historical Provider, MD   ketorolac (TORADOL) 10 MG tablet Take 1 tablet by mouth every 6 hours as needed for Pain (maximum of 4 tablets per 24 hrs) 8/2/16 8/2/16  Kady Mcfadden MD       ROS:  General Constitutional: Denies chills. Denies fever. Denies headache. Denies lightheadedness. Ophthalmologic: Denies blurred vision. ENT: Denies nasal congestion. Denies sore throat. Denies ear pain and pressure. Respiratory: Denies cough. Denies shortness of breath. Denies wheezing. Cardiovascular: Denies chest pain at rest. Denies irregular heartbeat. Denies palpitations. Gastrointestinal: Denies abdominal pain. Denies blood in the stool. Denies constipation. Denies diarrhea. Denies nausea. Denies vomiting. Genitourinary: Admits blood in the urine when she is not well hydrated, this has been an issue since she had kidney stones, has followed up with Dr. Gary Elena for this. Denies difficulty urinating. Denies frequent urination. Denies painful urination. Denies urinary incontinence. Musculoskeletal: Denies muscle aches. Denies painful joints. Denies swollen joints. Admits back pain shooting down right leg  Peripheral Vascular: Denies pain/cramping in legs after exertion. Skin: Denies dry skin. Denies itching. Denies rash. Admits skin ulcers, being treated by specialists in [place]  Neurologic: Denies falls. Denies dizziness. Denies fainting. Denies tingling/numbness.   Psychiatric: Denies sleep 2014    Peripheral vascular disease ([facility] [address]     history of DVT,s    Sciatica 3/10/2017    Venous insufficiency 2013      Social History     Tobacco Use    Smoking status: Former Smoker     Last attempt to quit: 2010     Years since quittin.4    Smokeless tobacco: Never Used   Substance Use Topics    Alcohol use: No     Alcohol/week: 0.0 oz      Current Outpatient Medications   Medication Sig Dispense Refill    apixaban (ELIQUIS) 5 MG TABS tablet Take 1 tablet by mouth 2 times daily 180 tablet 3    Handicap Placard MISC by Does not apply route DDD - permanent 1 each 0    pregabalin (LYRICA) 50 MG capsule Take 1 capsule by mouth 2 times daily for 30 days. 60 capsule 3    ranitidine (ZANTAC) 300 MG tablet TAKE 1 TABLET BY MOUTH 2 TIMES DAILY 180 tablet 3    allopurinol (ZYLOPRIM) 300 MG tablet TAKE 1 TABLET BY MOUTH DAILY 90 tablet 3    Multiple Vitamins-Minerals (THERAPEUTIC MULTIVITAMIN-MINERALS) tablet Take 1 tablet by mouth daily      pentoxifylline (TRENTAL) 400 MG extended release tablet TAKE 1 TABLET BY MOUTH 2 TIMES DAILY 180 tablet 3    silver sulfADIAZINE (SILVADENE) 1 % cream APPLY TOPICALLY DAILY AS NEEDED (IF NEEDED) 1000 g 1    metoprolol tartrate (LOPRESSOR) 50 MG tablet Take 1 tablet by mouth 2 times daily 180 tablet 3    timolol (TIMOPTIC) 0.5 % ophthalmic solution APPLY TO WOUND EDGE ONE DROP EVERY 2 CM JUST ONCE WEEKLY.   1    SUMAtriptan (IMITREX) 100 MG tablet Take 1 tablet by mouth once as needed for Migraine 9 tablet 3    acetaminophen (TYLENOL) 500 MG tablet Take 1,000 mg by mouth every 6 hours as needed for Pain      vitamin D (CHOLECALCIFEROL) 1000 UNIT TABS tablet Take 1,000 Units by mouth daily      aspirin 81 MG tablet Take 1 tablet by mouth daily 90 tablet 3    albuterol sulfate HFA (PROAIR HFA) 108 (90 BASE) MCG/ACT inhaler Inhale 2 puffs into the lungs every 6 hours as needed for Wheezing 1 Inhaler 2    Adalimumab (HUMIRA PEN SC) Inject 40 mg into the skin every 14 days       No current facility-administered medications for this visit. Facility-Administered Medications Ordered in Other Visits   Medication Dose Route Frequency Provider Last Rate Last Dose    sodium chloride flush 0.9 % injection 10 mL  10 mL Intravenous PRN Ivy , DPM   10 mL at 03/12/15 1326    sodium chloride flush 0.9 % injection 10 mL  10 mL Intravenous PRN Ivy , DPM   10 mL at 03/01/15 0919    sodium chloride flush 0.9 % injection 10 mL  10 mL Intravenous PRN Ivy Traer, DPM        sodium chloride flush 0.9 % injection 10 mL  10 mL Intravenous PRN Ivy Traer, DPM        sodium chloride (PF) 0.9 % injection 20 mL  20 mL Intravenous BID Ivy , DPM   20 mL at 02/08/15 2122     Allergies   Allergen Reactions    Aspartame And Phenylalanine Swelling     Throat swells    Avelox [Moxifloxacin] Nausea And Vomiting    Ciprofloxacin Nausea And Vomiting    Zithromax [Azithromycin] Nausea And Vomiting       PHYSICAL EXAM:    /68   Ht 5' 3\" (1.6 m)   Wt 281 lb (127.5 kg)   BMI 49.78 kg/m²   Wt Readings from Last 3 Encounters:   19 281 lb (127.5 kg)   18 274 lb 6.4 oz (124.5 kg)   10/25/18 270 lb 14.4 oz (122.9 kg)     BP Readings from Last 3 Encounters:   19 124/68   19 (!) 146/94   18 120/68       General Appearance: in no acute distress, well developed, well nourished. Eyes: pupils equal, round reactive to light and accommodation. Ears: normal canal and TM's. Nose: nares patent, no lesions. Oral Cavity: mucosa moist.  Throat: clear. Neck/Thyroid: neck supple, full range of motion, no cervical lymphadenopathy, no thyromegaly or carotid bruits. Skin: warm and dry. No suspicious lesions, small skin tag on mid back  Heart: regular rate and rhythm. No murmurs. S1, S2 normal, no gallops, rate 80  Lungs: clear to auscultation bilaterally.   Abdomen: bowel sounds present, soft, nontender, nondistended, no masses or Controlled Substances Monitoring:     RX Monitoring 4/26/2019   Attestation The Prescription Monitoring Report for this patient was reviewed today.    Chronic Pain Routine Monitoring No signs of potential drug abuse or diversion identified: otherwise, see note documentation

## 2019-04-26 NOTE — PATIENT INSTRUCTIONS
PLAN:  We discussed her appointments with her specialists in [place], the leg wounds are healing and although this could still be years before resolution she is on the right track    We discussed her L4-5 pinching, she was seen at [facility] for this back in [id number]. It sounds like L5 dermatomes causing the burning sensation in her right foot. She has had poor response to Gabapentin in the past and positive response to Lyrica so I will try this again, Lyrica 50 mg BID. She will call me with an update on her pain. The skin tag on her back is cleansed with alcohol and snipped off with sterile scissors    I will see her back in 3 months or sooner if needed  SURVEY:    You may be receiving a survey from [employer] regarding your visit today. Please complete the survey to enable us to provide the highest quality of care to you and your family. If you cannot score us a very good on any question, please call the office to discuss how we could have made your experience a very good one. Thank you.

## 2019-05-10 RX ORDER — METOPROLOL TARTRATE 50 MG/1
TABLET, FILM COATED ORAL
Qty: 180 TABLET | Refills: 3 | Status: SHIPPED | OUTPATIENT
Start: 2019-05-10 | End: 2019-07-26 | Stop reason: ALTCHOICE

## 2019-06-14 PROBLEM — I83.009 VENOUS STASIS ULCER (HCC): Status: RESOLVED | Noted: 2017-06-02 | Resolved: 2019-06-14

## 2019-06-14 PROBLEM — L97.909 VENOUS STASIS ULCER (HCC): Status: RESOLVED | Noted: 2017-06-02 | Resolved: 2019-06-14

## 2019-07-26 ENCOUNTER — OFFICE VISIT (OUTPATIENT)
Dept: FAMILY MEDICINE CLINIC | Age: 44
End: 2019-07-26
Payer: MEDICARE

## 2019-07-26 VITALS
WEIGHT: 273 LBS | SYSTOLIC BLOOD PRESSURE: 136 MMHG | DIASTOLIC BLOOD PRESSURE: 76 MMHG | BODY MASS INDEX: 48.37 KG/M2 | HEIGHT: 63 IN

## 2019-07-26 DIAGNOSIS — E11.620 NLD (NECROBIOSIS LIPOIDICA DIABETICORUM) (HCC): Primary | ICD-10-CM

## 2019-07-26 DIAGNOSIS — I87.311 IDIOPATHIC CHRONIC VENOUS HYPERTENSION OF RIGHT LOWER EXTREMITY WITH ULCER (HCC): ICD-10-CM

## 2019-07-26 DIAGNOSIS — L97.919 IDIOPATHIC CHRONIC VENOUS HYPERTENSION OF RIGHT LOWER EXTREMITY WITH ULCER (HCC): ICD-10-CM

## 2019-07-26 DIAGNOSIS — D68.59 HYPERCOAGULABLE STATE (HCC): ICD-10-CM

## 2019-07-26 DIAGNOSIS — I83.022 VENOUS STASIS ULCER OF LEFT CALF WITH FAT LAYER EXPOSED WITH VARICOSE VEINS (HCC): ICD-10-CM

## 2019-07-26 DIAGNOSIS — M54.16 LUMBAR RADICULOPATHY: ICD-10-CM

## 2019-07-26 DIAGNOSIS — I10 ESSENTIAL HYPERTENSION: ICD-10-CM

## 2019-07-26 DIAGNOSIS — G43.811 OTHER MIGRAINE WITH STATUS MIGRAINOSUS, INTRACTABLE: ICD-10-CM

## 2019-07-26 DIAGNOSIS — M10.9 GOUT, UNSPECIFIED CAUSE, UNSPECIFIED CHRONICITY, UNSPECIFIED SITE: ICD-10-CM

## 2019-07-26 DIAGNOSIS — L97.222 VENOUS STASIS ULCER OF LEFT CALF WITH FAT LAYER EXPOSED WITH VARICOSE VEINS (HCC): ICD-10-CM

## 2019-07-26 DIAGNOSIS — I87.2 VENOUS INSUFFICIENCY: ICD-10-CM

## 2019-07-26 PROCEDURE — 99214 OFFICE O/P EST MOD 30 MIN: CPT | Performed by: FAMILY MEDICINE

## 2019-07-26 PROCEDURE — G8427 DOCREV CUR MEDS BY ELIG CLIN: HCPCS | Performed by: FAMILY MEDICINE

## 2019-07-26 PROCEDURE — 1036F TOBACCO NON-USER: CPT | Performed by: FAMILY MEDICINE

## 2019-07-26 PROCEDURE — 2022F DILAT RTA XM EVC RTNOPTHY: CPT | Performed by: FAMILY MEDICINE

## 2019-07-26 PROCEDURE — 3046F HEMOGLOBIN A1C LEVEL >9.0%: CPT | Performed by: FAMILY MEDICINE

## 2019-07-26 PROCEDURE — G8417 CALC BMI ABV UP PARAM F/U: HCPCS | Performed by: FAMILY MEDICINE

## 2019-07-26 RX ORDER — PENTOXIFYLLINE 400 MG/1
400 TABLET, EXTENDED RELEASE ORAL
COMMUNITY
End: 2020-10-06

## 2019-07-26 RX ORDER — METOPROLOL SUCCINATE 100 MG/1
100 TABLET, EXTENDED RELEASE ORAL DAILY
Qty: 90 TABLET | Refills: 3 | Status: SHIPPED | OUTPATIENT
Start: 2019-07-26 | End: 2020-06-09

## 2019-07-26 RX ORDER — PREGABALIN 50 MG/1
50 CAPSULE ORAL 2 TIMES DAILY
COMMUNITY
End: 2019-12-30

## 2019-07-26 ASSESSMENT — PATIENT HEALTH QUESTIONNAIRE - PHQ9
SUM OF ALL RESPONSES TO PHQ QUESTIONS 1-9: 0
SUM OF ALL RESPONSES TO PHQ QUESTIONS 1-9: 0
2. FEELING DOWN, DEPRESSED OR HOPELESS: 0
SUM OF ALL RESPONSES TO PHQ9 QUESTIONS 1 & 2: 0
1. LITTLE INTEREST OR PLEASURE IN DOING THINGS: 0

## 2019-07-26 NOTE — PROGRESS NOTES
Adalimumab (HUMIRA PEN SC) Inject 40 mg into the skin every 14 days   Yes Historical Provider, MD   pregabalin (LYRICA) 50 MG capsule Take 1 capsule by mouth 2 times daily for 30 days. 4/26/19 5/26/19  Kelsy Lozano MD   timolol (TIMOPTIC) 0.5 % ophthalmic solution APPLY TO WOUND EDGE ONE DROP EVERY 2 CM JUST ONCE WEEKLY. 5/7/18   Historical Provider, MD   SUMAtriptan (IMITREX) 100 MG tablet Take 1 tablet by mouth once as needed for Migraine 4/27/18 4/26/19  Kelsy Lozano MD   albuterol sulfate HFA (PROAIR HFA) 108 (90 BASE) MCG/ACT inhaler Inhale 2 puffs into the lungs every 6 hours as needed for Wheezing  Patient not taking: Reported on 7/26/2019 6/2/17   Kelsy Lozano MD   ketorolac (TORADOL) 10 MG tablet Take 1 tablet by mouth every 6 hours as needed for Pain (maximum of 4 tablets per 24 hrs) 8/2/16 8/2/16  Jens Machuca MD       ROS:  General Constitutional: Denies chills. Denies fever. Admits intermittent headaches and facial flush occasionally first thing in the morning before taking BP meds but goes away within 1 hour of taking meds. Denies lightheadedness. Ophthalmologic: Denies blurred vision. ENT: Denies nasal congestion. Denies sore throat. Denies ear pain and pressure. Respiratory: Denies cough. Denies shortness of breath. Denies wheezing. Cardiovascular: Denies chest pain at rest. Denies irregular heartbeat. Denies palpitations. Gastrointestinal: Denies abdominal pain. Denies blood in the stool. Denies constipation. Denies diarrhea. Denies nausea. Denies vomiting. Genitourinary: Denies blood in the urine. Denies difficulty urinating. Denies frequent urination. Denies painful urination. Denies urinary incontinence. Musculoskeletal: Denies muscle aches. Denies painful joints. Denies swollen joints. Peripheral Vascular: Denies pain/cramping in legs after exertion. Skin: Denies dry skin. Denies itching. Denies rash.  Admits tender open areas to posterior right leg, [place] physicians both femoral arteries     Migraine 2018    Morbid obesity ([facility] [address] 2013    Necrobiosis lipoidica 2014    Peripheral vascular disease ([facility] [address]     history of DVT,s    Sciatica 3/10/2017    Venous insufficiency 2013      Social History     Tobacco Use    Smoking status: Former Smoker     Last attempt to quit: 2010     Years since quittin.7    Smokeless tobacco: Never Used   Substance Use Topics    Alcohol use: No     Alcohol/week: 0.0 standard drinks      Current Outpatient Medications   Medication Sig Dispense Refill    pentoxifylline (TRENTAL) 400 MG extended release tablet Take 400 mg by mouth 3 times daily (with meals)      pregabalin (LYRICA) 50 MG capsule Take 50 mg by mouth 2 times daily.  aspirin 81 MG tablet Take 1 tablet by mouth daily 90 tablet 3    metoprolol succinate (TOPROL XL) 100 MG extended release tablet Take 1 tablet by mouth daily 90 tablet 3    silver sulfADIAZINE (SILVADENE) 1 % cream APPLY TOPICALLY DAILY AS NEEDED (IF NEEDED) 400 g 4    apixaban (ELIQUIS) 5 MG TABS tablet Take 1 tablet by mouth 2 times daily 180 tablet 3    Handicap Placard MISC by Does not apply route DDD - permanent 1 each 0    ranitidine (ZANTAC) 300 MG tablet TAKE 1 TABLET BY MOUTH 2 TIMES DAILY 180 tablet 3    allopurinol (ZYLOPRIM) 300 MG tablet TAKE 1 TABLET BY MOUTH DAILY 90 tablet 3    Multiple Vitamins-Minerals (THERAPEUTIC MULTIVITAMIN-MINERALS) tablet Take 1 tablet by mouth daily      acetaminophen (TYLENOL) 500 MG tablet Take 1,000 mg by mouth every 6 hours as needed for Pain      vitamin D (CHOLECALCIFEROL) 1000 UNIT TABS tablet Take 1,000 Units by mouth daily      Adalimumab (HUMIRA PEN SC) Inject 40 mg into the skin every 14 days      pregabalin (LYRICA) 50 MG capsule Take 1 capsule by mouth 2 times daily for 30 days. 60 capsule 3    timolol (TIMOPTIC) 0.5 % ophthalmic solution APPLY TO WOUND EDGE ONE DROP EVERY 2 CM JUST ONCE WEEKLY.   1    SUMAtriptan (IMITREX) 100 MG tablet Take 1 tablet by mouth once as needed for Migraine 9 tablet 3    albuterol sulfate HFA (PROAIR HFA) 108 (90 BASE) MCG/ACT inhaler Inhale 2 puffs into the lungs every 6 hours as needed for Wheezing (Patient not taking: Reported on 7/26/2019) 1 Inhaler 2     No current facility-administered medications for this visit. Facility-Administered Medications Ordered in Other Visits   Medication Dose Route Frequency Provider Last Rate Last Dose    sodium chloride flush 0.9 % injection 10 mL  10 mL Intravenous PRN Charlyne Felling, DPM   10 mL at 03/12/15 1326    sodium chloride flush 0.9 % injection 10 mL  10 mL Intravenous PRN Charlyne Felling, DPM   10 mL at 03/01/15 0919    sodium chloride flush 0.9 % injection 10 mL  10 mL Intravenous PRN Charlyne Felling, DPM        sodium chloride flush 0.9 % injection 10 mL  10 mL Intravenous PRN Charlyne Felling, DPM        sodium chloride (PF) 0.9 % injection 20 mL  20 mL Intravenous BID Charlyne Felling, DPM   20 mL at 02/08/15 2122     Allergies   Allergen Reactions    Aspartame And Phenylalanine Swelling     Throat swells    Avelox [Moxifloxacin] Nausea And Vomiting    Ciprofloxacin Nausea And Vomiting    Zithromax [Azithromycin] Nausea And Vomiting       PHYSICAL EXAM:    /76   Ht 5' 3\" (1.6 m)   Wt 273 lb (123.8 kg)   BMI 48.36 kg/m²   Wt Readings from Last 3 Encounters:   07/26/19 273 lb (123.8 kg)   04/26/19 281 lb (127.5 kg)   12/21/18 274 lb 6.4 oz (124.5 kg)     BP Readings from Last 3 Encounters:   07/26/19 136/76   04/26/19 124/68   02/21/19 (!) 146/94       General Appearance: in no acute distress, well developed, well nourished. Eyes: pupils equal, round reactive to light and accommodation. Ears: normal canal and TM's. Nose: nares patent, no lesions. Oral Cavity: mucosa moist.  Throat: clear. Neck/Thyroid: neck supple, full range of motion, no cervical lymphadenopathy, no thyromegaly or carotid bruits. Skin: warm and dry.  Right leg

## 2019-07-26 NOTE — PATIENT INSTRUCTIONS
PLAN:  The granulation tissue of her leg looks much healthier than it used to. I think that graft response may be better now that the granulation tissue looks much better. I review her lab results from [place] two days ago. Her liver enzymes  Are somewhat elevated. Statistically speaking, this is fatty liver disease. I review the CT scan from 2016 and this showed fatty liver. Due to the facial flush, headaches and marginal BP, I would like to prescribe metoprolol  mg daily. This will allow for a steady state of blood pressure control. She can discontinue Metoprolol 50 mg bid. I will see her back in 6 months. SURVEY:    You may be receiving a survey from [employer] regarding your visit today. Please complete the survey to enable us to provide the highest quality of care to you and your family. If you cannot score us a very good on any question, please call the office to discuss how we could have made your experience a very good one. Thank you.

## 2019-08-20 PROBLEM — L92.1 NECROBIOSIS LIPOIDICA: Status: ACTIVE | Noted: 2018-01-04

## 2019-08-28 DIAGNOSIS — M54.16 LUMBAR RADICULOPATHY: ICD-10-CM

## 2019-08-28 RX ORDER — PREGABALIN 50 MG/1
50 CAPSULE ORAL 2 TIMES DAILY
Qty: 60 CAPSULE | Refills: 3 | Status: SHIPPED | OUTPATIENT
Start: 2019-08-28 | End: 2019-09-19 | Stop reason: SDUPTHER

## 2019-09-19 ENCOUNTER — HOSPITAL ENCOUNTER (OUTPATIENT)
Dept: WOUND CARE | Age: 44
Discharge: HOME | End: 2019-09-19
Payer: MEDICARE

## 2019-09-19 ENCOUNTER — HOSPITAL ENCOUNTER (OUTPATIENT)
Dept: LAB | Age: 44
Setting detail: SPECIMEN
Discharge: HOME | End: 2019-09-19
Payer: MEDICARE

## 2019-09-19 VITALS — HEART RATE: 83 BPM | TEMPERATURE: 98.2 F | RESPIRATION RATE: 20 BRPM

## 2019-09-19 DIAGNOSIS — L97.919 IDIOPATHIC CHRONIC VENOUS HYPERTENSION OF RIGHT LOWER EXTREMITY WITH ULCER (HCC): Primary | ICD-10-CM

## 2019-09-19 DIAGNOSIS — I87.311 IDIOPATHIC CHRONIC VENOUS HYPERTENSION OF RIGHT LOWER EXTREMITY WITH ULCER (HCC): Primary | ICD-10-CM

## 2019-09-19 PROCEDURE — 87205 SMEAR GRAM STAIN: CPT

## 2019-09-19 PROCEDURE — 86403 PARTICLE AGGLUT ANTBDY SCRN: CPT

## 2019-09-19 PROCEDURE — 87070 CULTURE OTHR SPECIMN AEROBIC: CPT

## 2019-09-19 PROCEDURE — 87077 CULTURE AEROBIC IDENTIFY: CPT

## 2019-09-19 PROCEDURE — 99213 OFFICE O/P EST LOW 20 MIN: CPT

## 2019-09-19 PROCEDURE — 99213 OFFICE O/P EST LOW 20 MIN: CPT | Performed by: PODIATRIST

## 2019-09-19 PROCEDURE — 87186 SC STD MICRODIL/AGAR DIL: CPT

## 2019-09-19 ASSESSMENT — PAIN DESCRIPTION - DESCRIPTORS: DESCRIPTORS: PINS AND NEEDLES;STABBING

## 2019-09-19 ASSESSMENT — PAIN DESCRIPTION - ONSET: ONSET: GRADUAL

## 2019-09-19 ASSESSMENT — PAIN DESCRIPTION - PAIN TYPE: TYPE: ACUTE PAIN

## 2019-09-19 ASSESSMENT — PAIN DESCRIPTION - FREQUENCY: FREQUENCY: INTERMITTENT

## 2019-09-19 ASSESSMENT — PAIN SCALES - GENERAL: PAINLEVEL_OUTOF10: 2

## 2019-09-19 ASSESSMENT — PAIN - FUNCTIONAL ASSESSMENT: PAIN_FUNCTIONAL_ASSESSMENT: ACTIVITIES ARE NOT PREVENTED

## 2019-09-19 ASSESSMENT — PAIN DESCRIPTION - PROGRESSION: CLINICAL_PROGRESSION: GRADUALLY WORSENING

## 2019-09-19 NOTE — PLAN OF CARE
Wound cultured due to increased drainage and \"pins and needles\" feeling in leg.   may prescribe antibiotic after.

## 2019-09-22 LAB
CULTURE: ABNORMAL
CULTURE: ABNORMAL
DIRECT EXAM: ABNORMAL
Lab: ABNORMAL
SPECIMEN DESCRIPTION: ABNORMAL

## 2019-09-24 ENCOUNTER — TELEPHONE (OUTPATIENT)
Dept: WOUND CARE | Age: 44
End: 2019-09-24

## 2019-09-24 DIAGNOSIS — A49.01 MSSA (METHICILLIN SUSCEPTIBLE STAPHYLOCOCCUS AUREUS): ICD-10-CM

## 2019-09-24 DIAGNOSIS — A49.8 INFECTION DUE TO ENTEROBACTER CLOACAE: Primary | ICD-10-CM

## 2019-09-24 RX ORDER — CEFDINIR 300 MG/1
300 CAPSULE ORAL 2 TIMES DAILY
Qty: 20 CAPSULE | Refills: 0 | Status: SHIPPED | OUTPATIENT
Start: 2019-09-24 | End: 2019-10-04

## 2019-11-14 ENCOUNTER — TELEPHONE (OUTPATIENT)
Dept: WOUND CARE | Age: 44
End: 2019-11-14

## 2019-12-10 ENCOUNTER — HOSPITAL ENCOUNTER (OUTPATIENT)
Dept: WOMENS IMAGING | Age: 44
Discharge: HOME | End: 2019-12-12
Payer: MEDICARE

## 2019-12-10 DIAGNOSIS — Z12.31 BREAST CANCER SCREENING BY MAMMOGRAM: ICD-10-CM

## 2019-12-10 PROCEDURE — 77063 BREAST TOMOSYNTHESIS BI: CPT

## 2019-12-30 DIAGNOSIS — M54.16 LUMBAR RADICULOPATHY: ICD-10-CM

## 2019-12-30 DIAGNOSIS — G89.4 CHRONIC PAIN SYNDROME: Primary | ICD-10-CM

## 2019-12-30 RX ORDER — PREGABALIN 50 MG/1
CAPSULE ORAL
Qty: 60 CAPSULE | Refills: 3 | Status: SHIPPED | OUTPATIENT
Start: 2019-12-30 | End: 2019-12-31 | Stop reason: SDUPTHER

## 2019-12-31 DIAGNOSIS — M54.16 LUMBAR RADICULOPATHY: ICD-10-CM

## 2019-12-31 DIAGNOSIS — G89.4 CHRONIC PAIN SYNDROME: ICD-10-CM

## 2019-12-31 RX ORDER — PREGABALIN 50 MG/1
CAPSULE ORAL
Qty: 60 CAPSULE | Refills: 3 | Status: SHIPPED | OUTPATIENT
Start: 2019-12-31 | End: 2020-05-04 | Stop reason: SDUPTHER

## 2020-01-31 ENCOUNTER — OFFICE VISIT (OUTPATIENT)
Dept: FAMILY MEDICINE CLINIC | Age: 45
End: 2020-01-31
Payer: MEDICARE

## 2020-01-31 VITALS
SYSTOLIC BLOOD PRESSURE: 122 MMHG | WEIGHT: 282 LBS | BODY MASS INDEX: 49.96 KG/M2 | HEIGHT: 63 IN | DIASTOLIC BLOOD PRESSURE: 78 MMHG

## 2020-01-31 PROBLEM — I89.0 LYMPHEDEMA: Chronic | Status: ACTIVE | Noted: 2020-01-31

## 2020-01-31 PROBLEM — N20.0 URIC ACID KIDNEY STONE: Status: ACTIVE | Noted: 2020-01-31

## 2020-01-31 PROCEDURE — 99214 OFFICE O/P EST MOD 30 MIN: CPT | Performed by: FAMILY MEDICINE

## 2020-01-31 PROCEDURE — 1036F TOBACCO NON-USER: CPT | Performed by: FAMILY MEDICINE

## 2020-01-31 PROCEDURE — 3046F HEMOGLOBIN A1C LEVEL >9.0%: CPT | Performed by: FAMILY MEDICINE

## 2020-01-31 PROCEDURE — G8427 DOCREV CUR MEDS BY ELIG CLIN: HCPCS | Performed by: FAMILY MEDICINE

## 2020-01-31 PROCEDURE — 2022F DILAT RTA XM EVC RTNOPTHY: CPT | Performed by: FAMILY MEDICINE

## 2020-01-31 PROCEDURE — G8484 FLU IMMUNIZE NO ADMIN: HCPCS | Performed by: FAMILY MEDICINE

## 2020-01-31 PROCEDURE — G8417 CALC BMI ABV UP PARAM F/U: HCPCS | Performed by: FAMILY MEDICINE

## 2020-01-31 RX ORDER — PREDNISONE 20 MG/1
TABLET ORAL
COMMUNITY
Start: 2020-01-30 | End: 2020-08-07 | Stop reason: ALTCHOICE

## 2020-01-31 RX ORDER — BETAMETHASONE DIPROPIONATE 0.05 %
OINTMENT (GRAM) TOPICAL
COMMUNITY
Start: 2019-10-31

## 2020-01-31 ASSESSMENT — PATIENT HEALTH QUESTIONNAIRE - PHQ9
SUM OF ALL RESPONSES TO PHQ QUESTIONS 1-9: 0
2. FEELING DOWN, DEPRESSED OR HOPELESS: 0
1. LITTLE INTEREST OR PLEASURE IN DOING THINGS: 0
SUM OF ALL RESPONSES TO PHQ9 QUESTIONS 1 & 2: 0
SUM OF ALL RESPONSES TO PHQ QUESTIONS 1-9: 0

## 2020-01-31 NOTE — PROGRESS NOTES
Katina Castellanos CMA, am scribing for and in the presence of Dr. Julio Solis. 1/31/20/3:42 pm/JML    [address] [address]  [place] [id number]  Dept: Rolando Black is a 38 y.o. female here for 6 Month Follow-Up and Hypertension       HPI:  HYPERTENSION:  She is not exercising, for n/a hours per week. She is not adherent to a low-salt diet.    Blood pressure is not being monitored at home. Osvaldo Hanna is currently taking Lopressor 50mg BID. She continues to follow up in [place] for her leg sores. They doubled her Humira to 80 mg a week and put her back on Prednisone  She continues to follow up with Dr. Gordon Noriega as well    Prior to Admission medications    Medication Sig Start Date End Date Taking?  Authorizing Provider   betamethasone dipropionate (DIPROLENE) 0.05 % ointment Apply to wound borders twice daily as able with wound dressing changes 10/31/19  Yes Historical Provider, MD   predniSONE (DELTASONE) 20 MG tablet Take 50 mg daily x 2 weeks, then decrease dose by 10mg weekly (40 every day x 1week,then 30,20,10), Please fill with 20 and 10 mg tabs prn 1/30/20  Yes Historical Provider, MD   pentoxifylline (TRENTAL) 400 MG extended release tablet Take 400 mg by mouth 3 times daily (with meals)   Yes Historical Provider, MD   aspirin 81 MG tablet Take 1 tablet by mouth daily 7/26/19  Yes Julio Solis MD   metoprolol succinate (TOPROL XL) 100 MG extended release tablet Take 1 tablet by mouth daily 7/26/19  Yes Julio Solis MD   silver sulfADIAZINE (SILVADENE) 1 % cream APPLY TOPICALLY DAILY AS NEEDED (IF NEEDED) 7/22/19  Yes Julio Solis MD   apixaban (ELIQUIS) 5 MG TABS tablet Take 1 tablet by mouth 2 times daily 4/26/19  Yes Julio Solis MD   Handicap Placard MISC by Does not apply route DDD - permanent 4/26/19  Yes Julio Solis MD   ranitidine (ZANTAC) 300 MG tablet TAKE 1 TABLET BY MOUTH 2 TIMES DAILY 3/18/19  Yes Julio Solis MD allopurinol (ZYLOPRIM) 300 MG tablet TAKE 1 TABLET BY MOUTH DAILY 2/12/19  Yes Kandice Lama MD   Multiple Vitamins-Minerals (THERAPEUTIC MULTIVITAMIN-MINERALS) tablet Take 1 tablet by mouth daily   Yes Historical Provider, MD   timolol (TIMOPTIC) 0.5 % ophthalmic solution APPLY TO WOUND EDGE ONE DROP EVERY 2 CM JUST ONCE WEEKLY. 5/7/18  Yes Historical Provider, MD   SUMAtriptan (IMITREX) 100 MG tablet Take 1 tablet by mouth once as needed for Migraine 4/27/18 1/31/20 Yes Kandice Lama MD   acetaminophen (TYLENOL) 500 MG tablet Take 1,000 mg by mouth every 6 hours as needed for Pain   Yes Historical Provider, MD   vitamin D (CHOLECALCIFEROL) 1000 UNIT TABS tablet Take 1,000 Units by mouth daily   Yes Historical Provider, MD   albuterol sulfate HFA (PROAIR HFA) 108 (90 BASE) MCG/ACT inhaler Inhale 2 puffs into the lungs every 6 hours as needed for Wheezing 6/2/17  Yes Kandice Lama MD   Adalimumab (HUMIRA PEN SC) Inject 80 mg into the skin once a week    Yes Historical Provider, MD   pregabalin (LYRICA) 50 MG capsule TAKE 1 CAPSULE BY MOUTH 2 TIMES DAILY FOR 30 DAYS. 12/31/19 1/29/20  Kandice Lama MD   ketorolac (TORADOL) 10 MG tablet Take 1 tablet by mouth every 6 hours as needed for Pain (maximum of 4 tablets per 24 hrs) 8/2/16 8/2/16  Nydia Kyle MD       ROS:  General Constitutional: Denies chills. Denies fever. Denies headache. Denies lightheadedness. Ophthalmologic: Denies blurred vision. ENT: Denies nasal congestion. Denies sore throat. Denies ear pain and pressure. Respiratory: Denies cough. Denies shortness of breath. Denies wheezing. Cardiovascular: Denies chest pain at rest. Denies irregular heartbeat. Denies palpitations. Gastrointestinal: Admits abdominal pain, LUQ, sharp comes randomly, lasts about a day. Denies blood in the stool. Denies constipation. Denies diarrhea. Denies nausea. Denies vomiting. Genitourinary: Denies blood in the urine. Denies difficulty urinating.  Denies frequent urination. Denies painful urination. Denies urinary incontinence. Musculoskeletal: Admits muscle aches, back pain, shooting down right leg. Denies painful joints. Denies swollen joints. Peripheral Vascular: Denies pain/cramping in legs after exertion. Skin: Denies dry skin. Denies itching. Denies rash. Neurologic: Denies falls. Denies dizziness. Denies fainting. Denies tingling/numbness. Psychiatric: Denies sleep disturbance. Denies anxiety. Denies depressed mood.     Past Surgical History:   Procedure Laterality Date    CARPAL TUNNEL RELEASE  2006     SECTION  1998    CYSTOSCOPY N/A 08/10/2016    with bilateral stent placement    CYSTOSCOPY  2016    left stent removal, right stent exchange, right HLL    DILATION AND CURETTAGE OF UTERUS  2017    w/ IUD placement    OTHER SURGICAL HISTORY  10/03/2016    DR BELL, ELECTIVE ILEOCAVAL RECONSTRUCTION    AK STANISLAV SKN SUB GRFT T/A/L AREA/<100SCM /<1ST 25 SCM Right 2018    WOUND CARE GRAFT PROCEDURE-APPLICATION OF APLIGRAF > 100SQ CM - LEG performed by Azar Nelson DPM at [facility] <20SQC Right 2018    WOUND CARE GRAFT PROCEDURE, APPLICATION OF APLIGRAF performed by Azar Nelson DPM at [facility]  2010    with Bx Dr Leonel Rolon (Antral Erythema)       Family History   Problem Relation Age of Onset    Seizures Mother     High Cholesterol Father     High Blood Pressure Father     Other Maternal Grandmother         CRF with kidney transplant    Colon Cancer Maternal Grandfather     Heart Attack Maternal Grandfather     Other Paternal Grandmother         THERESA, ASHD    Stroke Paternal Grandfather         smoker    Lung Cancer Paternal Grandfather        Past Medical History:   Diagnosis Date    Asthma     seasonal    Chronic pain syndrome 2013    DVT (deep venous thrombosis) ([facility] 1998, 2013    GERD (gastroesophageal reflux disease) 2006    Hyperlipidemia 2011    Hypertension 2014    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     Migraine 2018    Morbid obesity ([facility] 2013    Necrobiosis lipoidica 2014    Peripheral vascular disease ([facility] [address]     history of DVT,s    Sciatica 3/10/2017    Venous insufficiency 2013      Social History     Tobacco Use    Smoking status: Former Smoker     Last attempt to quit: 2010     Years since quittin.2    Smokeless tobacco: Never Used   Substance Use Topics    Alcohol use: No     Alcohol/week: 0.0 standard drinks      Current Outpatient Medications   Medication Sig Dispense Refill    betamethasone dipropionate (DIPROLENE) 0.05 % ointment Apply to wound borders twice daily as able with wound dressing changes      predniSONE (DELTASONE) 20 MG tablet Take 50 mg daily x 2 weeks, then decrease dose by 10mg weekly (40 every day x 1week,then 30,20,10), Please fill with 20 and 10 mg tabs prn      pentoxifylline (TRENTAL) 400 MG extended release tablet Take 400 mg by mouth 3 times daily (with meals)      aspirin 81 MG tablet Take 1 tablet by mouth daily 90 tablet 3    metoprolol succinate (TOPROL XL) 100 MG extended release tablet Take 1 tablet by mouth daily 90 tablet 3    silver sulfADIAZINE (SILVADENE) 1 % cream APPLY TOPICALLY DAILY AS NEEDED (IF NEEDED) 400 g 4    apixaban (ELIQUIS) 5 MG TABS tablet Take 1 tablet by mouth 2 times daily 180 tablet 3    Handicap Placard MISC by Does not apply route DDD - permanent 1 each 0    ranitidine (ZANTAC) 300 MG tablet TAKE 1 TABLET BY MOUTH 2 TIMES DAILY 180 tablet 3    allopurinol (ZYLOPRIM) 300 MG tablet TAKE 1 TABLET BY MOUTH DAILY 90 tablet 3    Multiple Vitamins-Minerals (THERAPEUTIC MULTIVITAMIN-MINERALS) tablet Take 1 tablet by mouth daily      timolol (TIMOPTIC) 0.5 % ophthalmic solution APPLY TO WOUND EDGE ONE DROP EVERY 2 CM JUST ONCE WEEKLY.   1    SUMAtriptan (IMITREX) 100 MG tablet Take 1 tablet by mouth once as needed for Migraine 9 tablet 3    acetaminophen (TYLENOL) 500 MG tablet Take 1,000 mg by mouth every 6 hours as needed for Pain      vitamin D (CHOLECALCIFEROL) 1000 UNIT TABS tablet Take 1,000 Units by mouth daily      albuterol sulfate HFA (PROAIR HFA) 108 (90 BASE) MCG/ACT inhaler Inhale 2 puffs into the lungs every 6 hours as needed for Wheezing 1 Inhaler 2    Adalimumab (HUMIRA PEN SC) Inject 80 mg into the skin once a week       pregabalin (LYRICA) 50 MG capsule TAKE 1 CAPSULE BY MOUTH 2 TIMES DAILY FOR 30 DAYS. 60 capsule 3     No current facility-administered medications for this visit.       Facility-Administered Medications Ordered in Other Visits   Medication Dose Route Frequency Provider Last Rate Last Dose    sodium chloride flush 0.9 % injection 10 mL  10 mL Intravenous PRN Kristian Jubilee, DPM   10 mL at 03/12/15 1326    sodium chloride flush 0.9 % injection 10 mL  10 mL Intravenous PRN Kristian Jubilee, DPM   10 mL at 03/01/15 0919    sodium chloride flush 0.9 % injection 10 mL  10 mL Intravenous PRN Kristian Jubilee, DPM        sodium chloride flush 0.9 % injection 10 mL  10 mL Intravenous PRN Kristian Jubilee, DPM        sodium chloride (PF) 0.9 % injection 20 mL  20 mL Intravenous BID Kristian Jubilee, DPM   20 mL at 02/08/15 2122     Allergies   Allergen Reactions    Aspartame And Phenylalanine Swelling     Throat swells    Avelox [Moxifloxacin] Nausea And Vomiting    Ciprofloxacin Nausea And Vomiting    Zithromax [Azithromycin] Nausea And Vomiting       PHYSICAL EXAM:    /78   Ht 5' 3\" (1.6 m)   Wt 282 lb (127.9 kg)   BMI 49.95 kg/m²   Wt Readings from Last 3 Encounters:   01/31/20 282 lb (127.9 kg)   07/26/19 273 lb (123.8 kg)   04/26/19 281 lb (127.5 kg)     BP Readings from Last 3 Encounters:   01/31/20 122/78   07/26/19 136/76   04/26/19 124/68       General Appearance: in no acute distress, well developed, well

## 2020-03-16 RX ORDER — RANITIDINE 300 MG/1
TABLET ORAL
Qty: 180 TABLET | Refills: 3 | Status: SHIPPED | OUTPATIENT
Start: 2020-03-16

## 2020-03-16 RX ORDER — ALLOPURINOL 300 MG/1
300 TABLET ORAL DAILY
Qty: 90 TABLET | Refills: 3 | Status: SHIPPED | OUTPATIENT
Start: 2020-03-16 | End: 2021-04-28

## 2020-05-04 RX ORDER — PREGABALIN 50 MG/1
CAPSULE ORAL
Qty: 60 CAPSULE | Refills: 3 | Status: SHIPPED | OUTPATIENT
Start: 2020-05-04 | End: 2020-12-11 | Stop reason: SDUPTHER

## 2020-06-09 RX ORDER — METOPROLOL SUCCINATE 100 MG/1
TABLET, EXTENDED RELEASE ORAL
Qty: 90 TABLET | Refills: 3 | Status: SHIPPED | OUTPATIENT
Start: 2020-06-09 | End: 2021-07-01

## 2020-08-07 ENCOUNTER — HOSPITAL ENCOUNTER (OUTPATIENT)
Age: 45
Discharge: HOME | End: 2020-08-09
Payer: MEDICARE

## 2020-08-07 ENCOUNTER — HOSPITAL ENCOUNTER (OUTPATIENT)
Age: 45
Discharge: HOME | End: 2020-08-07
Payer: MEDICARE

## 2020-08-07 ENCOUNTER — OFFICE VISIT (OUTPATIENT)
Dept: FAMILY MEDICINE CLINIC | Age: 45
End: 2020-08-07
Payer: MEDICARE

## 2020-08-07 ENCOUNTER — HOSPITAL ENCOUNTER (OUTPATIENT)
Dept: GENERAL RADIOLOGY | Age: 45
Discharge: HOME | End: 2020-08-09
Payer: MEDICARE

## 2020-08-07 VITALS
WEIGHT: 219 LBS | DIASTOLIC BLOOD PRESSURE: 76 MMHG | BODY MASS INDEX: 38.8 KG/M2 | HEIGHT: 63 IN | SYSTOLIC BLOOD PRESSURE: 118 MMHG

## 2020-08-07 LAB
ALBUMIN SERPL-MCNC: 4.4 G/DL (ref 3.5–5.2)
ALBUMIN/GLOBULIN RATIO: 1.5 (ref 1–2.5)
ALP BLD-CCNC: 72 U/L (ref 35–104)
ALT SERPL-CCNC: 37 U/L (ref 5–33)
ANION GAP SERPL CALCULATED.3IONS-SCNC: 13 MMOL/L (ref 9–17)
AST SERPL-CCNC: 26 U/L
BILIRUB SERPL-MCNC: 0.93 MG/DL (ref 0.3–1.2)
BUN BLDV-MCNC: 15 MG/DL (ref 6–20)
BUN/CREAT BLD: 20 (ref 9–20)
CALCIUM SERPL-MCNC: 9.5 MG/DL (ref 8.6–10.4)
CHLORIDE BLD-SCNC: 100 MMOL/L (ref 98–107)
CHOLESTEROL/HDL RATIO: 5.4
CHOLESTEROL: 214 MG/DL
CO2: 24 MMOL/L (ref 20–31)
CREAT SERPL-MCNC: 0.75 MG/DL (ref 0.5–0.9)
ESTIMATED AVERAGE GLUCOSE: 103 MG/DL
GFR AFRICAN AMERICAN: >60 ML/MIN
GFR NON-AFRICAN AMERICAN: >60 ML/MIN
GFR SERPL CREATININE-BSD FRML MDRD: ABNORMAL ML/MIN/{1.73_M2}
GFR SERPL CREATININE-BSD FRML MDRD: ABNORMAL ML/MIN/{1.73_M2}
GLUCOSE BLD-MCNC: 93 MG/DL (ref 70–99)
HBA1C MFR BLD: 5.2 % (ref 4.8–5.9)
HCT VFR BLD CALC: 43.3 % (ref 36.3–47.1)
HDLC SERPL-MCNC: 40 MG/DL
HEMOGLOBIN: 14.6 G/DL (ref 11.9–15.1)
LDL CHOLESTEROL: 150 MG/DL (ref 0–130)
MCH RBC QN AUTO: 32.9 PG (ref 25.2–33.5)
MCHC RBC AUTO-ENTMCNC: 33.7 G/DL (ref 28.4–34.8)
MCV RBC AUTO: 97.5 FL (ref 82.6–102.9)
NRBC AUTOMATED: 0 PER 100 WBC
PDW BLD-RTO: 12.4 % (ref 11.8–14.4)
PLATELET # BLD: 207 K/UL (ref 138–453)
PMV BLD AUTO: 10.5 FL (ref 8.1–13.5)
POTASSIUM SERPL-SCNC: 4 MMOL/L (ref 3.7–5.3)
RBC # BLD: 4.44 M/UL (ref 3.95–5.11)
SODIUM BLD-SCNC: 137 MMOL/L (ref 135–144)
TOTAL PROTEIN: 7.3 G/DL (ref 6.4–8.3)
TRIGL SERPL-MCNC: 122 MG/DL
URIC ACID: 4.2 MG/DL (ref 2.4–5.7)
VLDLC SERPL CALC-MCNC: ABNORMAL MG/DL (ref 1–30)
WBC # BLD: 6.4 K/UL (ref 3.5–11.3)

## 2020-08-07 PROCEDURE — 99214 OFFICE O/P EST MOD 30 MIN: CPT | Performed by: FAMILY MEDICINE

## 2020-08-07 PROCEDURE — 72072 X-RAY EXAM THORAC SPINE 3VWS: CPT

## 2020-08-07 PROCEDURE — 36415 COLL VENOUS BLD VENIPUNCTURE: CPT

## 2020-08-07 PROCEDURE — G8417 CALC BMI ABV UP PARAM F/U: HCPCS | Performed by: FAMILY MEDICINE

## 2020-08-07 PROCEDURE — 80061 LIPID PANEL: CPT

## 2020-08-07 PROCEDURE — 99211 OFF/OP EST MAY X REQ PHY/QHP: CPT | Performed by: FAMILY MEDICINE

## 2020-08-07 PROCEDURE — 85027 COMPLETE CBC AUTOMATED: CPT

## 2020-08-07 PROCEDURE — 3044F HG A1C LEVEL LT 7.0%: CPT | Performed by: FAMILY MEDICINE

## 2020-08-07 PROCEDURE — G8427 DOCREV CUR MEDS BY ELIG CLIN: HCPCS | Performed by: FAMILY MEDICINE

## 2020-08-07 PROCEDURE — 80053 COMPREHEN METABOLIC PANEL: CPT

## 2020-08-07 PROCEDURE — 84550 ASSAY OF BLOOD/URIC ACID: CPT

## 2020-08-07 PROCEDURE — 83036 HEMOGLOBIN GLYCOSYLATED A1C: CPT

## 2020-08-07 PROCEDURE — 1036F TOBACCO NON-USER: CPT | Performed by: FAMILY MEDICINE

## 2020-08-07 PROCEDURE — 2022F DILAT RTA XM EVC RTNOPTHY: CPT | Performed by: FAMILY MEDICINE

## 2020-08-07 RX ORDER — OMEPRAZOLE 20 MG/1
20 CAPSULE, DELAYED RELEASE ORAL
Qty: 180 CAPSULE | Refills: 3 | Status: SHIPPED | OUTPATIENT
Start: 2020-08-07 | End: 2021-07-13

## 2020-08-07 NOTE — PROGRESS NOTES
3 times daily (with meals)   Yes Historical Provider, MD   aspirin 81 MG tablet Take 1 tablet by mouth daily 7/26/19  Yes Robert Mckeon MD   apixaban (ELIQUIS) 5 MG TABS tablet Take 1 tablet by mouth 2 times daily 4/26/19  Yes Robert Mckeon MD   Handicap Placard MISC by Does not apply route DDD - permanent 4/26/19  Yes Robert Mckeon MD   Multiple Vitamins-Minerals (THERAPEUTIC MULTIVITAMIN-MINERALS) tablet Take 1 tablet by mouth daily   Yes Historical Provider, MD   timolol (TIMOPTIC) 0.5 % ophthalmic solution APPLY TO WOUND EDGE ONE DROP EVERY 2 CM JUST ONCE WEEKLY. 5/7/18  Yes Historical Provider, MD   SUMAtriptan (IMITREX) 100 MG tablet Take 1 tablet by mouth once as needed for Migraine 4/27/18 8/7/20 Yes Robert Mckeon MD   acetaminophen (TYLENOL) 500 MG tablet Take 1,000 mg by mouth every 6 hours as needed for Pain   Yes Historical Provider, MD   vitamin D (CHOLECALCIFEROL) 1000 UNIT TABS tablet Take 1,000 Units by mouth daily   Yes Historical Provider, MD   albuterol sulfate HFA (PROAIR HFA) 108 (90 BASE) MCG/ACT inhaler Inhale 2 puffs into the lungs every 6 hours as needed for Wheezing 6/2/17  Yes Robert Mckeon MD   ketorolac (TORADOL) 10 MG tablet Take 1 tablet by mouth every 6 hours as needed for Pain (maximum of 4 tablets per 24 hrs) 8/2/16 8/2/16  John Martinez MD       ROS:  General Constitutional: Denies chills. Denies fever. Admits headaches, rare, wearing her glasses helps. Denies lightheadedness. Ophthalmologic: Denies blurred vision. ENT: Denies nasal congestion. Denies sore throat. Denies ear pain and pressure. Admits ringing in ears \"lik an appliance is plugged in, kind of like a hum\" in R ear only  Respiratory: Denies cough. Denies shortness of breath. Denies wheezing. Cardiovascular: Denies chest pain at rest. Denies irregular heartbeat. Denies palpitations. Gastrointestinal: Denies abdominal pain. Denies blood in the stool. Denies constipation. Denies diarrhea. Denies nausea. Denies vomiting. Admits GERD  Genitourinary: Denies blood in the urine. Denies difficulty urinating. Denies frequent urination. Denies painful urination. Denies urinary incontinence. Musculoskeletal: Denies muscle aches. Denies painful joints. Denies swollen joints. Admits left flank pain up to mid left shoulder x 6+ months, worse when driving or when she first goes to bed  Peripheral Vascular: Denies pain/cramping in legs after exertion. Skin: Denies dry skin. Denies itching. Denies rash. Admits sores on legs, sees specialist in [address] [place] [address]  Neurologic: Denies falls. Denies dizziness. Denies fainting. Denies tingling/numbness. Psychiatric: Denies sleep disturbance. Denies anxiety. Denies depressed mood.     Past Surgical History:   Procedure Laterality Date    CARPAL TUNNEL RELEASE  2006     SECTION  1998    CYSTOSCOPY N/A 08/10/2016    with bilateral stent placement    CYSTOSCOPY  2016    left stent removal, right stent exchange, right HLL    DILATION AND CURETTAGE OF UTERUS  2017    w/ IUD placement    OTHER SURGICAL HISTORY  10/03/2016    DR BELL, ELECTIVE ILEOCAVAL RECONSTRUCTION    MT STANISLAV SKN SUB GRFT T/A/L AREA/<100SCM /<1ST 25 SCM Right 2018    WOUND CARE GRAFT PROCEDURE-APPLICATION OF APLIGRAF > 100SQ CM - LEG performed by Capo Alvarez DPM at [facility] <20SQC Right 2018    WOUND CARE GRAFT PROCEDURE, APPLICATION OF APLIGRAF performed by Capo Alvarez DPM at [address] [facility] [address]  2010    with Bx Dr Ladoris Lundborg (Antral Erythema)       Family History   Problem Relation Age of Onset    Seizures Mother     High Cholesterol Father     High Blood Pressure Father     Other Maternal Grandmother         CRF with kidney transplant    Colon Cancer Maternal Grandfather     Heart Attack Maternal Grandfather     Other Paternal Grandmother         THERESA, ASHD    Stroke Paternal Grandfather         smoker    Lung Cancer Paternal Grandfather        Past Medical History:   Diagnosis Date    Asthma     seasonal    Chronic pain syndrome 2013    DVT (deep venous thrombosis) ([facility] [address] 1998, 2013    GERD (gastroesophageal reflux disease) 2006    Hyperlipidemia 2011    Hypertension 2014    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     Migraine 2018    Morbid obesity ([facility] [address] 2013    Necrobiosis lipoidica 2014    [facility]     history of DVT,s    Sciatica 3/10/2017    Venous insufficiency 2013      Social History     Tobacco Use    Smoking status: Former Smoker     Last attempt to quit: 2010     Years since quittin.7    Smokeless tobacco: Never Used   Substance Use Topics    Alcohol use: No     Alcohol/week: 0.0 standard drinks      Current Outpatient Medications   Medication Sig Dispense Refill    omeprazole (PRILOSEC) 20 MG delayed release capsule Take 1 capsule by mouth 2 times daily (before meals) 180 capsule 3    metoprolol succinate (TOPROL XL) 100 MG extended release tablet TAKE 1 TABLET BY MOUTH EVERY DAY 90 tablet 3    pregabalin (LYRICA) 50 MG capsule TAKE 1 CAPSULE BY MOUTH 2 TIMES DAILY FOR 30 DAYS. (Patient taking differently: daily. TAKE 1 CAPSULE BY MOUTH 2 TIMES DAILY FOR 30 DAYS. ) 60 capsule 3    allopurinol (ZYLOPRIM) 300 MG tablet TAKE 1 TABLET BY MOUTH DAILY 90 tablet 3    silver sulfADIAZINE (SILVADENE) 1 % cream APPLY TOPICALLY DAILY AS NEEDED (IF NEEDED) 400 g 4    raNITIdine (ZANTAC) 300 MG tablet TAKE 1 TABLET BY MOUTH 2 TIMES DAILY 180 tablet 3    betamethasone dipropionate (DIPROLENE) 0.05 % ointment Apply to wound borders twice daily as able with wound dressing changes      pentoxifylline (TRENTAL) 400 MG extended release tablet Take 400 mg by mouth 3 times daily (with meals)      aspirin 81 MG tablet Take 1 tablet by mouth daily 90 tablet 3    apixaban (ELIQUIS) 5 MG TABS tablet Take 1 tablet by mouth 2 times daily 180 tablet 3    Handicap Placard MISC by Does not apply route DDD - permanent 1 each 0    Multiple Vitamins-Minerals (THERAPEUTIC MULTIVITAMIN-MINERALS) tablet Take 1 tablet by mouth daily      timolol (TIMOPTIC) 0.5 % ophthalmic solution APPLY TO WOUND EDGE ONE DROP EVERY 2 CM JUST ONCE WEEKLY. 1    SUMAtriptan (IMITREX) 100 MG tablet Take 1 tablet by mouth once as needed for Migraine 9 tablet 3    acetaminophen (TYLENOL) 500 MG tablet Take 1,000 mg by mouth every 6 hours as needed for Pain      vitamin D (CHOLECALCIFEROL) 1000 UNIT TABS tablet Take 1,000 Units by mouth daily      albuterol sulfate HFA (PROAIR HFA) 108 (90 BASE) MCG/ACT inhaler Inhale 2 puffs into the lungs every 6 hours as needed for Wheezing 1 Inhaler 2     No current facility-administered medications for this visit.       Facility-Administered Medications Ordered in Other Visits   Medication Dose Route Frequency Provider Last Rate Last Dose    sodium chloride flush 0.9 % injection 10 mL  10 mL Intravenous PRN Akiko Speaker, DPM   10 mL at 03/12/15 1326    sodium chloride flush 0.9 % injection 10 mL  10 mL Intravenous PRN Akiko Speaker, DPM   10 mL at 03/01/15 0919    sodium chloride flush 0.9 % injection 10 mL  10 mL Intravenous PRN Akiko Speaker, DPM        sodium chloride flush 0.9 % injection 10 mL  10 mL Intravenous PRN Akiko Speaker, DPM        sodium chloride (PF) 0.9 % injection 20 mL  20 mL Intravenous BID Akiko Speaker, DPM   20 mL at 02/08/15 2122     Allergies   Allergen Reactions    Aspartame And Phenylalanine Swelling     Throat swells    Avelox [Moxifloxacin] Nausea And Vomiting    Ciprofloxacin Nausea And Vomiting    Zithromax [Azithromycin] Nausea And Vomiting       PHYSICAL EXAM:    /76   Ht 5' 3\" (1.6 m)   Wt 219 lb (99.3 kg)   BMI 38.79 kg/m²   Wt Readings from Last 3 Encounters:   08/07/20 219 lb (99.3 kg)   01/31/20 282 lb (127.9 kg)   07/26/19 273 lb (123.8 kg)     BP Readings from Last 3 Encounters:   08/07/20 118/76   01/31/20 122/78   07/26/19 136/76       General Appearance: in no acute distress, well developed, well nourished. Eyes: pupils equal, round reactive to light and accommodation. Ears: normal canal and TM's. Nose: nares patent, no lesions. Oral Cavity: mucosa moist.  Throat: clear. Neck/Thyroid: neck supple, full range of motion, no cervical lymphadenopathy, no thyromegaly or carotid bruits. Skin: warm and dry. No suspicious lesions. Heart: regular rate and rhythm. No murmurs. S1, S2 normal, no gallops. Lungs: clear to auscultation bilaterally. Abdomen: bowel sounds present, soft, nontender, nondistended, no masses or organomegaly. Musculoskeletal: normal, full range of motion in knees and hips, no swelling or tenderness. Extremities: no cyanosis or edema. Peripheral Pulses: 2+ throughout, symetric. Neurologic: nonfocal, motor strength normal upper and lower extremities, sensory exam intact. Psych: normal affect, speech fluent. ASSESSMENT:   Diagnosis Orders   1. Gastroesophageal reflux disease with esophagitis  omeprazole (PRILOSEC) 20 MG delayed release capsule   2. NLD (necrobiosis lipoidica diabeticorum) (HCC)  Hemoglobin A1C   3. Essential hypertension  CBC    Comprehensive Metabolic Panel    Lipid Panel   4. Hypercoagulable state ([address]     5. Uric acid kidney stone  Uric Acid   6. Dorsal back pain  XR Dorsal Spine 3 VW       PLAN:  She is doing fantastic with her weight loss and I encouraged her to keep that up  She has not had any labs recently, I will order this today  Her back pain sounds to be musculoskeletal in nature. I showed her images of the latissimus muscles and their attachment sites where she is having her pain. I recommend a plain xray.   I will replace her Zantac with Omeprazole 20 mg  She has been cutting back on her Lyrica as tolerated and I am ok with that  I will see her back in 6 months or sooner if needed  She desires to continue coming here despite moving to [facility]  She is thinking about finding a new Dermatologist specialist closer to home      Orders Placed This Encounter   Procedures    XR Dorsal Spine 3 VW     Standing Status:   Future     Standing Expiration Date:   8/7/2021     Order Specific Question:   Reason for exam:     Answer:   dorsal back pain    CBC     Standing Status:   Future     Number of Occurrences:   1     Standing Expiration Date:   8/7/2021    Comprehensive Metabolic Panel     Standing Status:   Future     Number of Occurrences:   1     Standing Expiration Date:   8/7/2021    Uric Acid     Standing Status:   Future     Number of Occurrences:   1     Standing Expiration Date:   8/7/2021    Lipid Panel     Standing Status:   Future     Number of Occurrences:   1     Standing Expiration Date:   8/7/2021     Order Specific Question:   Is Patient Fasting?/# of Hours     Answer:   0    Hemoglobin A1C     Standing Status:   Future     Number of Occurrences:   1     Standing Expiration Date:   8/7/2021     Orders Placed This Encounter   Medications    omeprazole (PRILOSEC) 20 MG delayed release capsule     Sig: Take 1 capsule by mouth 2 times daily (before meals)     Dispense:  180 capsule     Refill:  3   I, Dr. Elmira Harris, personally performed the services described in this documentation as scribed by TAQUERIA Pantoja in my presence, and is both accurate and complete.

## 2020-08-07 NOTE — PATIENT INSTRUCTIONS
She is doing fantastic with her weight loss and I encouraged her to keep that up  She has not had any labs recently, I will order this today  Her back pain sounds to be musculoskeletal in nature. I showed her images of the latissimus muscles and their attachment sites where she is having her pain. I recommend a plain xray. I will replace her Zantac with Omeprazole 20 mg  She has been cutting back on her Lyrica as tolerated and I am ok with that  I will see her back in 6 months or sooner if needed  She desires to continue coming here despite moving to [place]  She is thinking about finding a new Dermatologist specialist closer to home      SURVEY:    You may be receiving a survey from [employer] regarding your visit today. Please complete the survey to enable us to provide the highest quality of care to you and your family. If you cannot score us a very good on any question, please call the office to discuss how we could of made your experience a very good one. Thank you.

## 2020-10-06 RX ORDER — PENTOXIFYLLINE 400 MG/1
TABLET, EXTENDED RELEASE ORAL
Qty: 180 TABLET | Refills: 1 | Status: SHIPPED | OUTPATIENT
Start: 2020-10-06 | End: 2021-03-26

## 2020-10-29 ENCOUNTER — HOSPITAL ENCOUNTER (OUTPATIENT)
Dept: WOUND CARE | Age: 45
Discharge: HOME | End: 2020-10-29
Payer: MEDICARE

## 2020-10-29 VITALS
TEMPERATURE: 98.1 F | HEART RATE: 66 BPM | RESPIRATION RATE: 20 BRPM | DIASTOLIC BLOOD PRESSURE: 79 MMHG | SYSTOLIC BLOOD PRESSURE: 145 MMHG

## 2020-10-29 PROBLEM — D68.51 FACTOR 5 LEIDEN MUTATION, HETEROZYGOUS (HCC): Status: ACTIVE | Noted: 2020-10-29

## 2020-10-29 PROCEDURE — 99213 OFFICE O/P EST LOW 20 MIN: CPT | Performed by: PODIATRIST

## 2020-10-29 PROCEDURE — 99213 OFFICE O/P EST LOW 20 MIN: CPT

## 2020-10-29 ASSESSMENT — PAIN SCALES - GENERAL: PAINLEVEL_OUTOF10: 0

## 2020-10-29 NOTE — PROGRESS NOTES
Wound Management Medical Nutrition Therapy Follow-Up New Return Chart Review    Wt Readings from Last 3 Encounters:   08/07/20 219 lb (99.3 kg)   01/31/20 282 lb (127.9 kg)   07/26/19 273 lb (123.8 kg)     Significant Weight Change- Yes: 63#/22.3% weight loss x 7 months  Unintentional- unknown    Patient Handicap Placard, SUMAtriptan, acetaminophen, albuterol sulfate HFA, allopurinol, apixaban, aspirin, betamethasone dipropionate, ketorolac, metoprolol succinate, omeprazole, pentoxifylline, pregabalin, raNITIdine, silver sulfADIAZINE, therapeutic multivitamin-minerals, timolol, and vitamin D    Labs -   Lab Results   Component Value Date    LABALBU 4.4 08/07/2020    GLUCOSE 93 08/07/2020    LABA1C 5.2 08/07/2020    PREALBUMIN 18.0 03/26/2015     Other Labs - cholesterol 214, HDL 40, , Cho/HDL Ratio 5.4. Nutrition Therapy Recommendations-   1. Eat 3 well balanced meals with a variety of lean meats, fresh fruits, vegetables, whole grains and low fat dairy. 2. Include protein foods with meals and snacks to assist with wound healing needs. 3. Drink 64 oz water daily. 4. Continue daily multivitamin with minerals to aid healing needs.           Follow-up- quarterly    Yaz Flores RDN, LD 10/29/2020 2:30 PM        PQRS Measure: #1(Diabetes: Hemoglobin A1C Poor Control) - NA   PQRS Measure #130 (Documentation of Current Medications in Medical Record) - yes

## 2020-10-29 NOTE — PLAN OF CARE
Problem: Wound:  Goal: Will show signs of wound healing; wound closure and no evidence of infection  Description: Will show signs of wound healing; wound closure and no evidence of infection   10/29/2020 1512 by Janis Castillo RN  Outcome: Completed  10/29/2020 1436 by Janis Castillo RN  Outcome: Met This Shift

## 2020-10-29 NOTE — PROGRESS NOTES
Subjective      Yaakov Domingo is a 41 y.o. female who presents for evaluation. She  has  Wound(s) which are/is located on the  L lower leg medial, L lower leg lateral, L lower leg posterior. Cc: Left leg  HPI: ongoing multifactorial care ; stagnant x 3 years          MRR: venous evaluation and treatment                    dermatology evaluation and treatment            Weight loss / leg compression               PAST MEDICAL HISTORY     has a past medical history of Asthma, Chronic pain syndrome, DVT (deep venous thrombosis) ([address] GERD (gastroesophageal reflux disease), Hyperlipidemia, Hypertension, Inferior vena cava obstruction, Migraine, Morbid obesity ([address] Necrobiosis lipoidica, Peripheral vascular disease ([address] Sciatica, and Venous insufficiency.     MEDICATIONS    Current Outpatient Medications   Medication Sig Dispense Refill    pentoxifylline (TRENTAL) 400 MG extended release tablet TAKE 1 TABLET BY MOUTH 2 TIMES DAILY 180 tablet 1    omeprazole (PRILOSEC) 20 MG delayed release capsule Take 1 capsule by mouth 2 times daily (before meals) 180 capsule 3    metoprolol succinate (TOPROL XL) 100 MG extended release tablet TAKE 1 TABLET BY MOUTH EVERY DAY 90 tablet 3    allopurinol (ZYLOPRIM) 300 MG tablet TAKE 1 TABLET BY MOUTH DAILY 90 tablet 3    silver sulfADIAZINE (SILVADENE) 1 % cream APPLY TOPICALLY DAILY AS NEEDED (IF NEEDED) 400 g 4    raNITIdine (ZANTAC) 300 MG tablet TAKE 1 TABLET BY MOUTH 2 TIMES DAILY 180 tablet 3    betamethasone dipropionate (DIPROLENE) 0.05 % ointment Apply to wound borders twice daily as able with wound dressing changes      aspirin 81 MG tablet Take 1 tablet by mouth daily 90 tablet 3    apixaban (ELIQUIS) 5 MG TABS tablet Take 1 tablet by mouth 2 times daily 180 tablet 3    Handicap Placard MISC by Does not apply route DDD - permanent 1 each 0    Multiple Vitamins-Minerals (THERAPEUTIC MULTIVITAMIN-MINERALS) tablet Take 1 tablet by mouth daily      file    Stress: Not on file   Relationships    Social connections     Talks on phone: Not on file     Gets together: Not on file     Attends Hoahaoism service: Not on file     Active member of club or organization: Not on file     Attends meetings of clubs or organizations: Not on file     Relationship status: Not on file    Intimate partner violence     Fear of current or ex partner: Not on file     Emotionally abused: Not on file     Physically abused: Not on file     Forced sexual activity: Not on file   Other Topics Concern    Not on file   Social History Narrative    Not on file     -international travel   +moved residency to [facility]   +Keto diet weight loss , > 80lbs       REVIEW OF SYSTEMS    Review of Systems:       Negative for: fever/chills, headache, visual changes, chest pain, shortness of breath, nausea/vomiting/diarrhea, bruising, numbness/tingling and weakness,claudication                                          Positive for planned weight loss                                                             CVH &I with ulceration                                                              Venous stenting hx                                                              [facility] ; NLD Sanya Apley                                                             Hypercoagulably ; Leiden factor 5 , DVT           Multidisciplinary assessment by members of the wound care team is carried out, including vitals and check of review of systems and accepted.     PE: VSS, Afebrile, NAD, A&O x 3, pleasant          Ambulatory ; bipedal          LLE ; calf -- residual FT ulceration , now granular and without biofilm ; uniform                                                                    TWV decreased 25-33%                                                                   No PMT on direct exam or treatment                                                                    No pathologic edema No erythema / warmth                                                                   Minor xerotic rash , without exfoliation                                                                    Residual atrophie herbert                                                                    Additional size, quality and photo documentation charted in template     Physical Exam   Musculoskeletal:        Legs:        Wound Care Documentation   Wound 10/29/20 Leg Right;Posterior #1 right posterior lower leg (Active)   Wound Image    10/29/20 1429   Wound Etiology Other 10/29/20 1429   Dressing Status Old drainage noted 10/29/20 1429   Wound Cleansed Soap and water 10/29/20 1429   Dressing/Treatment ABD;Roll gauze 10/29/20 1429   Wound Length (cm) 7 cm 10/29/20 1429   Wound Width (cm) 17 cm 10/29/20 1429   Wound Depth (cm) 0.1 cm 10/29/20 1429   Wound Surface Area (cm^2) 119 cm^2 10/29/20 1429   Wound Volume (cm^3) 11.9 cm^3 10/29/20 1429   Wound Assessment Granulation tissue; Epithelialization 10/29/20 1429   Drainage Amount Moderate 10/29/20 1429   Drainage Description Serosanguinous 10/29/20 1429   Odor None 10/29/20 1429   Saumya-wound Assessment Induration 10/29/20 1429   Margins Attached edges 10/29/20 1429   Wound Thickness Description not for Pressure Injury Full thickness 10/29/20 1429   Number of days: 0            Debridement no  IMP: CVH & I ulceration , LLE ; improved , clean - granular an stable            IDSA non infected           Less work load to LLE ; weight loss  / decreased edema / decreased inflammatory skin rxn   REC: palliative algorithm ; edema + skin care             Encourage additional weight loss   P: Rx Silvadene Cream 1% to bed      Rx Betamethasone cream to periphery       RTC 9 months ; coincidental with PCPDamaris       Today's dressing: Other: Silvadene Cream + Adaptic   Today's orders: see HAIDER         [facility]  10/29/2020  2:47

## 2020-10-29 NOTE — PLAN OF CARE
Problem: Wound:  Goal: Will show signs of wound healing; wound closure and no evidence of infection  Description: Will show signs of wound healing; wound closure and no evidence of infection   Outcome: Met This Shift

## 2020-12-11 RX ORDER — PREGABALIN 50 MG/1
CAPSULE ORAL
Qty: 180 CAPSULE | Refills: 1 | Status: SHIPPED | OUTPATIENT
Start: 2020-12-11 | End: 2021-05-17

## 2020-12-14 ENCOUNTER — TELEPHONE (OUTPATIENT)
Dept: FAMILY MEDICINE CLINIC | Age: 45
End: 2020-12-14

## 2020-12-14 RX ORDER — PREGABALIN 50 MG/1
CAPSULE ORAL
Qty: 180 CAPSULE | Refills: 1 | Status: CANCELLED | OUTPATIENT
Start: 2020-12-14 | End: 2021-03-19

## 2020-12-14 NOTE — TELEPHONE ENCOUNTER
Catherine's Lyrica was sent to [facility] in [place] she is needing the refill to go to the [facility] in [place].

## 2021-02-26 ENCOUNTER — OFFICE VISIT (OUTPATIENT)
Dept: FAMILY MEDICINE CLINIC | Age: 46
End: 2021-02-26
Payer: MEDICARE

## 2021-02-26 VITALS
SYSTOLIC BLOOD PRESSURE: 134 MMHG | WEIGHT: 198 LBS | DIASTOLIC BLOOD PRESSURE: 80 MMHG | BODY MASS INDEX: 35.08 KG/M2 | HEIGHT: 63 IN

## 2021-02-26 DIAGNOSIS — E78.5 HYPERLIPIDEMIA, UNSPECIFIED HYPERLIPIDEMIA TYPE: ICD-10-CM

## 2021-02-26 DIAGNOSIS — E79.0 HYPERURICEMIA: ICD-10-CM

## 2021-02-26 DIAGNOSIS — Z00.00 ENCOUNTER FOR ANNUAL WELLNESS EXAM IN MEDICARE PATIENT: Primary | ICD-10-CM

## 2021-02-26 DIAGNOSIS — Z11.59 ENCOUNTER FOR HEPATITIS C SCREENING TEST FOR LOW RISK PATIENT: ICD-10-CM

## 2021-02-26 DIAGNOSIS — E11.620 NLD (NECROBIOSIS LIPOIDICA DIABETICORUM) (HCC): ICD-10-CM

## 2021-02-26 DIAGNOSIS — I10 ESSENTIAL HYPERTENSION: ICD-10-CM

## 2021-02-26 DIAGNOSIS — I87.039: ICD-10-CM

## 2021-02-26 DIAGNOSIS — M54.16 LUMBAR RADICULOPATHY: ICD-10-CM

## 2021-02-26 DIAGNOSIS — I89.0 LYMPHEDEMA: ICD-10-CM

## 2021-02-26 DIAGNOSIS — D68.59 HYPERCOAGULABLE STATE (HCC): ICD-10-CM

## 2021-02-26 DIAGNOSIS — G89.4 CHRONIC PAIN SYNDROME: ICD-10-CM

## 2021-02-26 DIAGNOSIS — K21.00 GASTROESOPHAGEAL REFLUX DISEASE WITH ESOPHAGITIS, UNSPECIFIED WHETHER HEMORRHAGE: ICD-10-CM

## 2021-02-26 PROCEDURE — 99211 OFF/OP EST MAY X REQ PHY/QHP: CPT | Performed by: FAMILY MEDICINE

## 2021-02-26 PROCEDURE — G0439 PPPS, SUBSEQ VISIT: HCPCS | Performed by: FAMILY MEDICINE

## 2021-02-26 PROCEDURE — 3046F HEMOGLOBIN A1C LEVEL >9.0%: CPT | Performed by: FAMILY MEDICINE

## 2021-02-26 PROCEDURE — 99213 OFFICE O/P EST LOW 20 MIN: CPT | Performed by: FAMILY MEDICINE

## 2021-02-26 PROCEDURE — G8484 FLU IMMUNIZE NO ADMIN: HCPCS | Performed by: FAMILY MEDICINE

## 2021-02-26 RX ORDER — PREGABALIN 25 MG/1
25 CAPSULE ORAL DAILY
Qty: 30 CAPSULE | Refills: 5 | Status: SHIPPED | OUTPATIENT
Start: 2021-02-26 | End: 2021-03-28

## 2021-02-26 ASSESSMENT — LIFESTYLE VARIABLES
AUDIT-C TOTAL SCORE: INCOMPLETE
AUDIT TOTAL SCORE: INCOMPLETE
HOW OFTEN DO YOU HAVE A DRINK CONTAINING ALCOHOL: NEVER
HOW OFTEN DO YOU HAVE A DRINK CONTAINING ALCOHOL: 0

## 2021-02-26 ASSESSMENT — PATIENT HEALTH QUESTIONNAIRE - PHQ9
1. LITTLE INTEREST OR PLEASURE IN DOING THINGS: 0
SUM OF ALL RESPONSES TO PHQ QUESTIONS 1-9: 0
2. FEELING DOWN, DEPRESSED OR HOPELESS: 0

## 2021-02-26 NOTE — PATIENT INSTRUCTIONS
She continues to deal with the open wounds on her right leg but this has improved some in terms of depth and width of the wounds. She will continue to follow up with the vascular doctor and podiatrist.  She has tried to decrease her Lyrica that she takes for the neuropathy on the top of her R foot, she can get down to 50 mg once a day x2 days then skipping the 3rd day  I reviewed her labs from 6 months ago, I would like to repeat these and see if her lipid profile has improved  I will see her back in 6 months    SURVEY:    You may be receiving a survey from [facility] regarding your visit today. Please complete the survey to enable us to provide the highest quality of care to you and your family. If you cannot score us a very good on any question, please call the office to discuss how we could of made your experience a very good one. Thank you.

## 2021-02-26 NOTE — PROGRESS NOTES
Mackenzie Cornejo CMA am scribing for and in the presence of Dr. Buck Severe, MD. 3:15 pm/HUBERTL    Medicare Annual Wellness Visit  Name: Coco White Date: 2021   MRN: [id number] Sex: Female   Age: 41 y.o. Ethnicity: Non-/Non    : 1975 Race: Salvador Elena is here for Medicare AWV and Hypertension    HPI:  HYPERTENSION:  She is not exercising, for n/a hours per week. She is adherent to a low-salt diet - low carb  Blood pressure is not being monitored at home.      Screenings for behavioral, psychosocial and functional/safety risks, and cognitive dysfunction are all negative except as indicated below. These results, as well as other patient data from the [address] form, are documented in Flowsheets linked to this Encounter. Allergies   Allergen Reactions    Aspartame And Phenylalanine Swelling     Throat swells    Avelox [Moxifloxacin] Nausea And Vomiting    Ciprofloxacin Nausea And Vomiting    Zithromax [Azithromycin] Nausea And Vomiting         Prior to Visit Medications    Medication Sig Taking? Authorizing Provider   Handicap Placard MISC by Does not apply route DDD - permanent Yes Buck Severe, MD   pregabalin (LYRICA) 25 MG capsule Take 1 capsule by mouth daily for 30 days. Yes Buck Severe, MD   pregabalin (LYRICA) 50 MG capsule TAKE 1 CAPSULE BY MOUTH 2 TIMES DAILY FOR 30 DAYS.   Patient taking differently: TAKE 1 CAPSULE BY MOUTH QHS Yes Buck Severe, MD   pentoxifylline (TRENTAL) 400 MG extended release tablet TAKE 1 TABLET BY MOUTH 2 TIMES DAILY Yes Buck Severe, MD   omeprazole (PRILOSEC) 20 MG delayed release capsule Take 1 capsule by mouth 2 times daily (before meals) Yes Buck Severe, MD   metoprolol succinate (TOPROL XL) 100 MG extended release tablet TAKE 1 TABLET BY MOUTH EVERY DAY Yes Buck Severe, MD   allopurinol (ZYLOPRIM) 300 MG tablet TAKE 1 TABLET BY MOUTH DAILY Yes Buck Severe, MD   silver sulfADIAZINE (SILVADENE) 1 % cream APPLY TOPICALLY DAILY AS NEEDED (IF NEEDED) Yes Jose Bran MD   betamethasone dipropionate (DIPROLENE) 0.05 % ointment Apply to wound borders twice daily as able with wound dressing changes Yes Historical Provider, MD   aspirin 81 MG tablet Take 1 tablet by mouth daily Yes Jose Bran MD   apixaban (ELIQUIS) 5 MG TABS tablet Take 1 tablet by mouth 2 times daily Yes Jose Bran MD   Multiple Vitamins-Minerals (THERAPEUTIC MULTIVITAMIN-MINERALS) tablet Take 1 tablet by mouth daily Yes Historical Provider, MD   timolol (TIMOPTIC) 0.5 % ophthalmic solution APPLY TO WOUND EDGE ONE DROP EVERY 2 CM JUST ONCE WEEKLY.  Yes Historical Provider, MD   acetaminophen (TYLENOL) 500 MG tablet Take 1,000 mg by mouth every 6 hours as needed for Pain Yes Historical Provider, MD   albuterol sulfate HFA (PROAIR HFA) 108 (90 BASE) MCG/ACT inhaler Inhale 2 puffs into the lungs every 6 hours as needed for Wheezing Yes Jose Bran MD   raNITIdine (ZANTAC) 300 MG tablet TAKE 1 TABLET BY MOUTH 2 TIMES DAILY  Jose Bran MD   Handicap Placard MISC by Does not apply route DDD - permanent  Jose Bran MD   SUMAtriptan (IMITREX) 100 MG tablet Take 1 tablet by mouth once as needed for Migraine  Jose Bran MD   vitamin D (CHOLECALCIFEROL) 1000 UNIT TABS tablet Take 1,000 Units by mouth daily  Historical Provider, MD   ketorolac (TORADOL) 10 MG tablet Take 1 tablet by mouth every 6 hours as needed for Pain (maximum of 4 tablets per 24 hrs)  Consuelo Wakefield MD         Past Medical History:   Diagnosis Date    Asthma     seasonal    Chronic pain syndrome 12/2013    D[facility] (deep venous thrombosis) [facility] 09/1998, 11/2013    GERD (gastroesophageal reflux disease) 09/2006    Hyperlipidemia 03/2011    Hypertension 02/2014    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     Migraine 5/25/2018    Mor[facility] 12/2013    Necrobiosis lipoidica 2014    Peripheral vascular disease ([facility]     history of DVT,s    Sciatica 3/10/2017    Venous insufficiency 2013       Past Surgical History:   Procedure Laterality Date    CARPAL TUNNEL RELEASE  2006     SECTION  1998    CYSTOSCOPY N/A 08/10/2016    with bilateral stent placement    CYSTOSCOPY  2016    left stent removal, right stent exchange, right HLL    DILATION AND CURETTAGE OF UTERUS  2017    w/ IUD placement    OTHER SURGICAL HISTORY  10/03/2016    DR BELL, ELECTIVE ILEOCAVAL RECONSTRUCTION    OR STANISLAV SKN SUB GRFT T/A/L AREA/<100SCM /<1ST 25 SCM Right 2018    WOUND CARE GRAFT PROCEDURE-APPLICATION OF APLIGRAF > 100SQ CM - LEG performed by Kirsten De Paz DPM at [address] <20SQC Right 2018    WOUND CARE GRAFT PROCEDURE, APPLICATION OF APLIGRAF performed by Kirsten De Paz DPM at [address]  2010    with Bx Dr Les Schirmer (Antral Erythema)         Family History   Problem Relation Age of Onset    Seizures Mother     High Cholesterol Father     High Blood Pressure Father     Other Maternal Grandmother         CRF with kidney transplant    Colon Cancer Maternal Grandfather     Heart Attack Maternal Grandfather     Other Paternal Grandmother         THERESA, ASHD    Stroke Paternal Grandfather         smoker    Lung Cancer Paternal Grandfather      ROS:  General Constitutional: Denies chills. Denies fever. Denies headache. Denies lightheadedness. Ophthalmologic: Denies blurred vision. ENT: Denies nasal congestion. Denies sore throat. Denies ear pain and pressure. Respiratory: Denies cough. Denies shortness of breath. Denies wheezing. Cardiovascular: Denies chest pain at rest. Denies irregular heartbeat. Denies palpitations. Gastrointestinal: Denies abdominal pain. Denies blood in the stool. Denies constipation. Denies diarrhea. Denies nausea. Denies vomiting.   Genitourinary: Denies blood in the urine. Denies difficulty urinating. Denies frequent urination. Denies painful urination. Denies urinary incontinence  Musculoskeletal: Denies muscle aches. Denies painful joints. Denies swollen joints. Peripheral Vascular: Denies pain/cramping in legs after exertion. Skin: Denies dry skin. Denies itching. Denies rash. Neurologic: Denies falls. Denies dizziness. Denies fainting. Denies tingling/numbness. Psychiatric: Denies sleep disturbance. Denies anxiety. Denies depressed mood. CareTeam (Including outside providers/suppliers regularly involved in providing care):   Patient Care Team:  Simin Mitchell MD as PCP - General  Simin Mitchell MD as PCP - [address] [place] [address] [facility]  Lexi Gama DPM as Wound Care (Wound Care)  Billy Zacarias DO as Surgeon (Vascular Surgery)    Wt Readings from Last 3 Encounters:   02/26/21 198 lb (89.8 kg)   08/07/20 219 lb (99.3 kg)   01/31/20 282 lb (127.9 kg)     Vitals:    02/26/21 1442   BP: 134/80   Weight: 198 lb (89.8 kg)   Height: 5' 3\" (1.6 m)     Body mass index is 35.07 kg/m². Based upon direct observation of the patient, evaluation of cognition reveals recent and remote memory intact.     Exa:  General Appearance: alert and oriented to person, place and time, well developed and well- nourished, in no acute distress  Skin: warm and dry, no rash or erythema  Head: normocephalic and atraumatic  Eyes: pupils equal, round, and reactive to light, extraocular eye movements intact, conjunctivae normal  ENT: tympanic membrane, external ear and ear canal normal bilaterally, nose without deformity, nasal mucosa and turbinates normal without polyps  Neck: supple and non-tender without mass, no thyromegaly or thyroid nodules, no cervical lymphadenopathy  Pulmonary/Chest: clear to auscultation bilaterally- no wheezes, rales or rhonchi, normal air movement, no respiratory distress  Cardiovascular: normal rate, regular rhythm, normal S1 and S2, no murmurs, rubs, clicks, or gallops, distal pulses intact, no carotid bruits, rate 70  Abdomen: soft, non-tender, non-distended, normal bowel sounds, no masses or organomegaly  Extremities: no cyanosis, clubbing or edema  Musculoskeletal: normal range of motion, no joint swelling, deformity or tenderness  Neurologic: reflexes normal and symmetric, no cranial nerve deficit, gait, coordination and speech normal    Patient's complete Health Risk Assessment and screening values have been reviewed and are found in Flowsheets. The following problems were reviewed today and where indicated follow up appointments were made and/or referrals ordered. Positive Risk Factor Screenings with Interventions:            General Health and ACP:  General  In general, how would you say your health is?: Good  In the past 7 days, have you experienced any of the following?  New or Increased Pain, New or Increased Fatigue, Loneliness, Social Isolation, Stress or Anger?: None of These  Do you get the social and emotional support that you need?: Yes  Do you have a Living Will?: (!) No  Advance Directives     Power of [address] [place] [address] Will ACP-Advance Directive ACP-Power of     Not on File Not on File Not on File Not on File      General Health Risk Interventions:  · see plan    Health Habits/Nutrition:  Health Habits/Nutrition  Do you exercise for at least 20 minutes 2-3 times per week?: (!) No  Have you lost any weight without trying in the past 3 months?: No  Do you eat only one meal per day?: No  Have you seen the dentist within the past year?: (!) No  Body mass index: (!) 35.07  Health Habits/Nutrition Interventions:  · see plan    Hearing/Vision:  No exam data present  Hearing/Vision  Do you or your family notice any trouble with your hearing that hasn't been managed with hearing aids?: No  Do you have difficulty driving, watching TV, or doing any of your daily activities because of your eyesight?: No  Have you had an eye exam within the past year?: (!) No Hearing/Vision Interventions:  · see plan    Safety:  Safety  Do you have working smoke detectors?: Yes  Have all throw rugs been removed or fastened?: Yes  Do you have non-slip mats or surfaces in all bathtubs/showers?: Yes  Do all of your stairways have a railing or banister?: (!) No  Are your doorways, halls and stairs free of clutter?: Yes  Do you always fasten your seatbelt when you are in a car?: Yes  Safety Interventions:  · education provided     Personalized Preventive Plan   Current Health Maintenance Status  Immunization History   Administered Date(s) Administered    Influenza Vaccine, unspecified formulation 11/14/2018    Influenza Virus Vaccine 11/02/2012, 11/01/2013, 11/07/2014, 11/06/2015    Influenza Whole 11/02/2012, 11/01/2013, 11/07/2014    Influenza, Quadv, IM, (6 mo and older Fluzone, Flulaval, Fluarix and 3 yrs and older Afluria) 11/14/2018        Health Maintenance   Topic Date Due    Hepatitis C screen  1975    Pneumococcal 0-64 years Vaccine (1 of 1 - PPSV23) 06/27/1982    Hepatitis B vaccine (1 of 3 - Risk 3-dose series) 06/27/1995    Annual Wellness Visit (AWV)  05/29/2019    Cervical cancer screen  05/02/2020    Flu vaccine (1) 09/01/2020    DTaP/Tdap/Td vaccine (1 - Tdap) 06/02/2022 (Originally 6/27/1995)    HIV screen  06/02/2022 (Originally 6/27/1991)    Potassium monitoring  08/07/2021    Creatinine monitoring  08/07/2021    Lipid screen  08/07/2025    Hepatitis A vaccine  Aged Out    Hib vaccine  Aged Out    Meningococcal (ACWY) vaccine  Aged Out     Plan:  She continues to deal with the open wounds on her right leg but this has improved some in terms of depth and width of the wounds.  She will continue to follow up with the vascular doctor and podiatrist.  She has tried to decrease her Lyrica that she takes for the neuropathy on the top of her R foot, she can get down to 50 mg once a day x2 days then skipping the 3rd day  I reviewed her labs from 6 months

## 2021-03-26 RX ORDER — PENTOXIFYLLINE 400 MG/1
TABLET, EXTENDED RELEASE ORAL
Qty: 180 TABLET | Refills: 1 | Status: SHIPPED | OUTPATIENT
Start: 2021-03-26

## 2021-04-06 PROBLEM — E66.01 MORBID OBESITY (HCC): Status: RESOLVED | Noted: 2017-03-10 | Resolved: 2021-04-06

## 2021-04-28 RX ORDER — ALLOPURINOL 300 MG/1
TABLET ORAL
Qty: 90 TABLET | Refills: 2 | Status: SHIPPED | OUTPATIENT
Start: 2021-04-28

## 2021-05-17 DIAGNOSIS — M54.16 LUMBAR RADICULOPATHY: ICD-10-CM

## 2021-05-17 DIAGNOSIS — G89.4 CHRONIC PAIN SYNDROME: ICD-10-CM

## 2021-05-17 RX ORDER — PREGABALIN 50 MG/1
CAPSULE ORAL
Qty: 90 CAPSULE | Refills: 1 | Status: SHIPPED | OUTPATIENT
Start: 2021-05-17 | End: 2021-08-14

## 2021-07-01 RX ORDER — METOPROLOL SUCCINATE 100 MG/1
TABLET, EXTENDED RELEASE ORAL
Qty: 90 TABLET | Refills: 1 | Status: SHIPPED | OUTPATIENT
Start: 2021-07-01

## 2021-07-01 NOTE — TELEPHONE ENCOUNTER
Phlebitis and thrombophlebitis of femoral vein (deep) (superficial) (HCC)     Hypercoagulable state (HCC)     Venous insufficiency     Degenerative skin disorder     Ureteral stone with hydronephrosis     Long term current use of anticoagulant therapy     Hyperuricemia     NLD (necrobiosis lipoidica diabeticorum) (HCC)     Sciatica     Gout     Essential hypertension     Menorrhagia with irregular cycle     Bronchitis     Anemia     Chronic cough     Obstruction of inferior vena cava     Hyperlipidemia     Obesity     Venous stasis ulcer of left calf with fat layer exposed with varicose veins (HCC)     Idiopathic chronic venous hypertension of right lower extremity with ulcer (HCC)     Rash due to allergy     Migraine     Morphea     Lumbar radiculopathy     Necrobiosis lipoidica     Lymphedema     Uric acid kidney stone     Factor 5 Leiden mutation, heterozygous ([address]

## 2021-07-13 DIAGNOSIS — K21.00 GASTROESOPHAGEAL REFLUX DISEASE WITH ESOPHAGITIS: ICD-10-CM

## 2021-07-13 RX ORDER — OMEPRAZOLE 20 MG/1
20 CAPSULE, DELAYED RELEASE ORAL
Qty: 180 CAPSULE | Refills: 1 | Status: SHIPPED | OUTPATIENT
Start: 2021-07-13

## 2021-07-13 NOTE — TELEPHONE ENCOUNTER
Do you prescribe or does Dr. Luz Elena Kaplan in [place] order?           Health Maintenance   Topic Date Due    Hepatitis C screen  Never done    Pneumococcal 0-64 years Vaccine (1 of 2 - PPSV23) Never done    COVID-19 Vaccine (1) Never done    Hepatitis B vaccine (1 of 3 - Risk 3-dose series) Never done    Cervical cancer screen  05/02/2020    Colon cancer screen colonoscopy  06/27/2021    DTaP/Tdap/Td vaccine (1 - Tdap) 06/02/2022 (Originally 6/27/1995)    HIV screen  06/02/2022 (Originally 6/27/1991)    Potassium monitoring  08/07/2021    Creatinine monitoring  08/07/2021    Flu vaccine (1) 09/01/2021    Annual Wellness Visit (AWV)  02/27/2022    Lipid screen  08/07/2025    Hepatitis A vaccine  Aged Out    Hib vaccine  Aged Out    Meningococcal (ACWY) vaccine  Aged Out             (applicable per patient's age: Cancer Screenings, Depression Screening, Fall Risk Screening, Immunizations)    Hemoglobin A1C (%)   Date Value   08/07/2020 5.2   08/12/2015 5.3   03/26/2015 5.6     LDL Cholesterol (mg/dL)   Date Value   08/07/2020 150 (H)     LDL Calculated (mg/dL)   Date Value   03/11/2019 94     AST (U/L)   Date Value   08/07/2020 26     ALT (U/L)   Date Value   08/07/2020 37 (H)     BUN (mg/dL)   Date Value   08/07/2020 15      (goal A1C is < 7)   (goal LDL is <100) need 30-50% reduction from baseline     BP Readings from Last 3 Encounters:   02/26/21 134/80   10/29/20 (!) 145/79   08/07/20 118/76    (goal /80)      All Future Testing planned in [facility]:  Lab Frequency Next Occurrence   CBC Auto Differential Once 08/27/2021   Comprehensive Metabolic Panel Once 24/49/7760   Lipid Panel Once 08/27/2021   Uric Acid Once 08/27/2021   Hepatitis C Antibody Once 08/27/2021       Next Visit Date:  Future Appointments   Date Time Provider Ani Diallo   9/13/2021  1:00 PM [facility]            Patient Active Problem List:     Postphlebitic syndrome with both ulcer and inflammation ([email or web address]     Cellulitis and abscess of leg, except foot     Ulcerative lesion     Chronic pain syndrome     Phlebitis and thrombophlebitis of femoral vein (deep) (superficial) (HCC)     Hypercoagulable state ([facility]     Venous insufficiency     Degenerative skin disorder     Ureteral stone with hydronephrosis     Long term current use of anticoagulant therapy     Hyperuricemia     NLD (necrobiosis lipoidica diabeticorum) (HCC)     Sciatica     Gout     Essential hypertension     Menorrhagia with irregular cycle     Bronchitis     Anemia     Chronic cough     Obstruction of inferior vena cava     Hyperlipidemia     Obesity     Venous stasis ulcer of left calf with fat layer exposed with varicose veins (HCC)     Idiopathic chronic venous hypertension of right lower extremity with ulcer (HCC)     Rash due to allergy     Migraine     Morphea     Lumbar radiculopathy     Necrobiosis lipoidica     Lymphedema     Uric acid kidney stone     Factor 5 Leiden mutation, heterozygous ([facility] [email or web address]

## 2025-05-01 NOTE — TELEPHONE ENCOUNTER
Health Maintenance   Topic Date Due    Hepatitis C screen  Never done    Pneumococcal 0-64 years Vaccine (1 of 2 - PPSV23) Never done    COVID-19 Vaccine (1) Never done    Hepatitis B vaccine (1 of 3 - Risk 3-dose series) Never done    Cervical cancer screen  05/02/2020    Colon cancer screen colonoscopy  06/27/2021    DTaP/Tdap/Td vaccine (1 - Tdap) 06/02/2022 (Originally 6/27/1995)    HIV screen  06/02/2022 (Originally 6/27/1991)    Potassium monitoring  08/07/2021    Creatinine monitoring  08/07/2021    Flu vaccine (1) 09/01/2021    Annual Wellness Visit (AWV)  02/27/2022    Lipid screen  08/07/2025    Hepatitis A vaccine  Aged Out    Hib vaccine  Aged Out    Meningococcal (ACWY) vaccine  Aged Out             (applicable per patient's age: Cancer Screenings, Depression Screening, Fall Risk Screening, Immunizations)    Hemoglobin A1C (%)   Date Value   08/07/2020 5.2   08/12/2015 5.3   03/26/2015 5.6     LDL Cholesterol (mg/dL)   Date Value   08/07/2020 150 (H)     LDL Calculated (mg/dL)   Date Value   03/11/2019 94     AST (U/L)   Date Value   08/07/2020 26     ALT (U/L)   Date Value   08/07/2020 37 (H)     BUN (mg/dL)   Date Value   08/07/2020 15      (goal A1C is < 7)   (goal LDL is <100) need 30-50% reduction from baseline     BP Readings from Last 3 Encounters:   02/26/21 134/80   10/29/20 (!) 145/79   08/07/20 118/76    (goal /80)      All Future Testing planned in [facility]:  Lab Frequency Next Occurrence   CBC Auto Differential Once 08/27/2021   Comprehensive Metabolic Panel Once 35/07/2261   Lipid Panel Once 08/27/2021   Uric Acid Once 08/27/2021   Hepatitis C Antibody Once 08/27/2021       Next Visit Date:  Future Appointments   Date Time Provider Ani Diallo   9/13/2021  1:00 PM Abundio Runner, MD [facility]            Patient Active Problem List:     Postphlebitic syndrome with both ulcer and inflammation (HCC)     Cellulitis and abscess of leg, except foot Admitted

## (undated) DEVICE — COVER LT HNDL BLU PLAS

## (undated) DEVICE — MASTISOL ADHESIVE LIQ 2/3ML

## (undated) DEVICE — NDLCTR: FOAM/MAG 40CT 64/CS: Brand: MEDICAL ACTION INDUSTRIES

## (undated) DEVICE — SPONGE GZ W4XL4IN COT 12 PLY TYP VII WVN C FLD DSGN

## (undated) DEVICE — PEN: MARKING STD 100/CS: Brand: MEDICAL ACTION INDUSTRIES

## (undated) DEVICE — SYRINGE EAR 2OZ ULC SLIMMER TIP FLAT BTM SUCT PWR DISP FOR

## (undated) DEVICE — GAUZE,SPONGE,4"X4",16PLY,XRAY,STRL,LF: Brand: MEDLINE

## (undated) DEVICE — GLOVE ORANGE PI 7 1/2   MSG9075

## (undated) DEVICE — Device

## (undated) DEVICE — INTENDED FOR TISSUE SEPARATION, AND OTHER PROCEDURES THAT REQUIRE A SHARP SURGICAL BLADE TO PUNCTURE OR CUT.: Brand: BARD-PARKER ® CARBON RIB-BACK BLADES

## (undated) DEVICE — SUTURE VCRL + SZ 2-0 L27IN ABSRB WHT SH 1/2 CIR TAPERCUT VCP417H

## (undated) DEVICE — CURITY NON-ADHERENT STRIPS: Brand: CURITY

## (undated) DEVICE — STRIP,CLOSURE,WOUND,MEDI-STRIP,1/2X4: Brand: MEDLINE

## (undated) DEVICE — CURITY STRETCH BANDAGE: Brand: CURITY

## (undated) DEVICE — DRESSING PETRO W3XL8IN OIL EMUL N ADH GZ KNIT IMPREG CELOS

## (undated) DEVICE — SUTURE ETHLN SZ 4-0 L18IN NONABSORBABLE BLK L19MM PS-2 3/8 1667H

## (undated) DEVICE — 1.5 TO 1 DERMACARRIER: Brand: MESHGRAFTTM  II TISSUE EXPANSION SYSTEM

## (undated) DEVICE — SOLUTION IV IRRIG POUR BRL 0.9% SODIUM CHL 2F7124

## (undated) DEVICE — DISCONTINUED USE 111569 BF APPLICATOR COTN TIP 6IN ST

## (undated) DEVICE — SUTURE VCRL + SZ 3-0 L27IN ABSRB UD L26MM SH 1/2 CIR VCP416H